# Patient Record
Sex: MALE | Race: WHITE | Employment: FULL TIME | URBAN - METROPOLITAN AREA
[De-identification: names, ages, dates, MRNs, and addresses within clinical notes are randomized per-mention and may not be internally consistent; named-entity substitution may affect disease eponyms.]

---

## 2017-06-05 ENCOUNTER — OFFICE VISIT (OUTPATIENT)
Dept: INTERNAL MEDICINE CLINIC | Facility: CLINIC | Age: 32
End: 2017-06-05

## 2017-06-05 VITALS
SYSTOLIC BLOOD PRESSURE: 130 MMHG | HEART RATE: 75 BPM | DIASTOLIC BLOOD PRESSURE: 92 MMHG | WEIGHT: 265 LBS | OXYGEN SATURATION: 98 % | BODY MASS INDEX: 30.66 KG/M2 | HEIGHT: 78 IN | RESPIRATION RATE: 18 BRPM

## 2017-06-05 DIAGNOSIS — Z00.00 ANNUAL PHYSICAL EXAM: Primary | ICD-10-CM

## 2017-06-05 DIAGNOSIS — R07.89 LEFT-SIDED CHEST WALL PAIN: ICD-10-CM

## 2017-06-05 DIAGNOSIS — E66.09 NON MORBID OBESITY DUE TO EXCESS CALORIES: ICD-10-CM

## 2017-06-05 DIAGNOSIS — Z23 NEED FOR TDAP VACCINATION: ICD-10-CM

## 2017-06-05 DIAGNOSIS — R40.0 DAYTIME SLEEPINESS: ICD-10-CM

## 2017-06-05 PROCEDURE — 99395 PREV VISIT EST AGE 18-39: CPT | Performed by: FAMILY MEDICINE

## 2017-06-05 PROCEDURE — 93000 ELECTROCARDIOGRAM COMPLETE: CPT | Performed by: FAMILY MEDICINE

## 2017-06-05 NOTE — PROGRESS NOTES
Inocencio Councilman is a 32year old male who presents for a complete physical exam.   HPI:     Concerns:  CHRONIC (X YEARS) EPISODIC LEFT SIDED ACW PAINS WHICH ARE SHARP AND LAST ABOUT A MINUTE, THEN GO AWAY. UNRELATED TO EXERTION. NOT FASTING CURRENTLY.   WIF 78\"  Wt 265 lb  BMI 30.63 kg/m2  SpO2 98%  Body mass index is 30.63 kg/(m^2).    GENERAL: well developed, MILDLY OBESE,in no apparent distress  SKIN: no rashes,no suspicious lesions  HEENT: atraumatic, normocephalic, O/P normal.  NARROW PHARYNGEAL AIRWAY

## 2017-06-06 PROCEDURE — 90715 TDAP VACCINE 7 YRS/> IM: CPT | Performed by: FAMILY MEDICINE

## 2017-06-06 PROCEDURE — 90471 IMMUNIZATION ADMIN: CPT | Performed by: FAMILY MEDICINE

## 2018-03-02 ENCOUNTER — OFFICE VISIT (OUTPATIENT)
Dept: INTERNAL MEDICINE CLINIC | Facility: CLINIC | Age: 33
End: 2018-03-02

## 2018-03-02 VITALS
OXYGEN SATURATION: 99 % | BODY MASS INDEX: 30.2 KG/M2 | SYSTOLIC BLOOD PRESSURE: 116 MMHG | RESPIRATION RATE: 17 BRPM | WEIGHT: 261 LBS | HEIGHT: 78 IN | DIASTOLIC BLOOD PRESSURE: 72 MMHG | HEART RATE: 59 BPM

## 2018-03-02 DIAGNOSIS — Z23 NEED FOR INFLUENZA VACCINATION: Primary | ICD-10-CM

## 2018-03-02 DIAGNOSIS — Z29.8 NEED FOR MALARIA PROPHYLAXIS: ICD-10-CM

## 2018-03-02 PROCEDURE — 99212 OFFICE O/P EST SF 10 MIN: CPT | Performed by: FAMILY MEDICINE

## 2018-03-02 RX ORDER — DOXYCYCLINE 100 MG/1
100 TABLET ORAL 2 TIMES DAILY
Qty: 14 TABLET | Refills: 0 | Status: SHIPPED | OUTPATIENT
Start: 2018-03-02 | End: 2018-03-09

## 2018-03-03 NOTE — PROGRESS NOTES
CC:  Consult (Pt presents to clinic for consult regarding out of country trip to [facility] next week for 10 days. )      Hx of CC:  WILL BE TRAVELING TO [address] AND [facility] SOON X 10 DAYS. HERE FOR ANY NEEDED IMMUNIZATIONS/PRESCRIPTIONS.     Dacia

## 2018-09-20 ENCOUNTER — TELEPHONE (OUTPATIENT)
Dept: INTERNAL MEDICINE CLINIC | Facility: CLINIC | Age: 33
End: 2018-09-20

## 2018-09-20 DIAGNOSIS — G89.29 CHRONIC RIGHT SHOULDER PAIN: Primary | ICD-10-CM

## 2018-09-20 DIAGNOSIS — M25.511 CHRONIC RIGHT SHOULDER PAIN: Primary | ICD-10-CM

## 2019-03-02 ENCOUNTER — HOSPITAL ENCOUNTER (OUTPATIENT)
Age: 34
Discharge: HOME | End: 2019-03-02
Attending: EMERGENCY MEDICINE
Payer: COMMERCIAL

## 2019-03-02 VITALS
DIASTOLIC BLOOD PRESSURE: 76 MMHG | RESPIRATION RATE: 18 BRPM | OXYGEN SATURATION: 99 % | SYSTOLIC BLOOD PRESSURE: 122 MMHG | HEART RATE: 58 BPM | TEMPERATURE: 98 F

## 2019-03-02 DIAGNOSIS — J01.40 ACUTE NON-RECURRENT PANSINUSITIS: ICD-10-CM

## 2019-03-02 DIAGNOSIS — H65.92 LEFT OTITIS MEDIA WITH EFFUSION: Primary | ICD-10-CM

## 2019-03-02 PROCEDURE — 99204 OFFICE O/P NEW MOD 45 MIN: CPT

## 2019-03-02 PROCEDURE — 99213 OFFICE O/P EST LOW 20 MIN: CPT

## 2019-03-02 RX ORDER — AMOXICILLIN 875 MG/1
875 TABLET, COATED ORAL 2 TIMES DAILY
Qty: 20 TABLET | Refills: 0 | Status: SHIPPED | OUTPATIENT
Start: 2019-03-02 | End: 2019-03-12

## 2019-03-02 RX ORDER — FLUTICASONE PROPIONATE 50 MCG
2 SPRAY, SUSPENSION (ML) NASAL DAILY
Qty: 16 G | Refills: 0 | Status: SHIPPED | OUTPATIENT
Start: 2019-03-02 | End: 2019-04-01

## 2019-03-02 NOTE — ED PROVIDER NOTES
Patient Seen in: [facility] [address] [place] [address]    History   Patient presents with:  Ear Problem Pain (neurosensory)    Stated Complaint: cough, ear pain    HPI    The patient is a 79-year-old male with no significant past medical history p Regular rate and rhythm without murmur  Abdomen: Soft, nontender and nondistended  Neurologic: Patient is awake, alert and oriented ×3.   The patient's motor strength is 5 out of 5 and symmetric in the upper and lower extremities bilaterally  Extremities: N

## 2019-12-05 ENCOUNTER — OFFICE VISIT (OUTPATIENT)
Dept: FAMILY MEDICINE CLINIC | Facility: CLINIC | Age: 34
End: 2019-12-05
Payer: COMMERCIAL

## 2019-12-05 VITALS
BODY MASS INDEX: 30.7 KG/M2 | DIASTOLIC BLOOD PRESSURE: 86 MMHG | OXYGEN SATURATION: 98 % | WEIGHT: 265.38 LBS | HEIGHT: 78 IN | HEART RATE: 83 BPM | TEMPERATURE: 99 F | RESPIRATION RATE: 16 BRPM | SYSTOLIC BLOOD PRESSURE: 116 MMHG

## 2019-12-05 DIAGNOSIS — J02.0 STREP THROAT: Primary | ICD-10-CM

## 2019-12-05 DIAGNOSIS — H69.82 ETD (EUSTACHIAN TUBE DYSFUNCTION), LEFT: ICD-10-CM

## 2019-12-05 DIAGNOSIS — J06.9 VIRAL URI WITH COUGH: ICD-10-CM

## 2019-12-05 PROCEDURE — 99213 OFFICE O/P EST LOW 20 MIN: CPT | Performed by: PHYSICIAN ASSISTANT

## 2019-12-05 PROCEDURE — 87880 STREP A ASSAY W/OPTIC: CPT | Performed by: PHYSICIAN ASSISTANT

## 2019-12-05 RX ORDER — FLUTICASONE PROPIONATE 50 MCG
2 SPRAY, SUSPENSION (ML) NASAL DAILY
Qty: 1 INHALER | Refills: 0 | Status: SHIPPED | OUTPATIENT
Start: 2019-12-05 | End: 2019-12-13 | Stop reason: ALTCHOICE

## 2019-12-05 RX ORDER — AMOXICILLIN 875 MG/1
875 TABLET, COATED ORAL 2 TIMES DAILY
Qty: 20 TABLET | Refills: 0 | Status: SHIPPED | OUTPATIENT
Start: 2019-12-05 | End: 2019-12-15

## 2019-12-05 NOTE — PROGRESS NOTES
CHIEF COMPLAINT:   Patient presents with:  Sore Throat      HPI:   Renny Iniguez is a 29year old male who presents for URI sympotoms for  3 days. Patient reports nasal congestion, sore throat-6/10, right ear pressure, dry cough, equilibrium feels off.   Radha Dominguez THROAT: oral mucosa pink, moist. Posterior pharynx  erythematous and injected. + exudates. No uvular deviation, drooling, muffled voice, hot potato voice, trismus, or signs of abscess.    NECK: Supple, non-tender  LUNGS: clear to auscultation bilaterally, You have had a positive test for strep throat. Strep throat is a contagious illness. It is spread by coughing, kissing or by touching others after touching your mouth or nose.  Symptoms include throat pain that is worse with swallowing, aching all over, hea · You can't swallow liquids or you can't open your mouth wide because of throat pain  · Signs of dehydration. These include very dark urine or no urine, sunken eyes, and dizziness.   · Trouble breathing or noisy breathing  · Muffled voice  · Rash  Preventio · You may use acetaminophen or ibuprofen to control pain and fever, unless another medicine was prescribed. If you have chronic liver or kidney disease, have ever had a stomach ulcer or gastrointestinal bleeding, or are taking blood-thinning medicines, dianne © 5393-5829 [employer] [address]. [address] [facility], [address] [place]. All rights reserved. This information is not intended as a substitute for professional medical care. Always follow your healthcare professional's instructions.             The

## 2019-12-13 ENCOUNTER — OFFICE VISIT (OUTPATIENT)
Dept: INTERNAL MEDICINE CLINIC | Facility: CLINIC | Age: 34
End: 2019-12-13
Payer: COMMERCIAL

## 2019-12-13 VITALS
HEIGHT: 78 IN | DIASTOLIC BLOOD PRESSURE: 86 MMHG | WEIGHT: 268 LBS | OXYGEN SATURATION: 97 % | HEART RATE: 65 BPM | BODY MASS INDEX: 31.01 KG/M2 | SYSTOLIC BLOOD PRESSURE: 122 MMHG | RESPIRATION RATE: 15 BRPM

## 2019-12-13 DIAGNOSIS — Z00.00 ANNUAL PHYSICAL EXAM: Primary | ICD-10-CM

## 2019-12-13 DIAGNOSIS — Z83.3 FAMILY HISTORY OF DIABETES MELLITUS: ICD-10-CM

## 2019-12-13 PROCEDURE — 85025 COMPLETE CBC W/AUTO DIFF WBC: CPT | Performed by: FAMILY MEDICINE

## 2019-12-13 PROCEDURE — 99395 PREV VISIT EST AGE 18-39: CPT | Performed by: FAMILY MEDICINE

## 2019-12-13 PROCEDURE — 80053 COMPREHEN METABOLIC PANEL: CPT | Performed by: FAMILY MEDICINE

## 2019-12-13 PROCEDURE — 80061 LIPID PANEL: CPT | Performed by: FAMILY MEDICINE

## 2019-12-14 NOTE — PROGRESS NOTES
Rik Weber is a 29year old male who presents for a complete physical exam/FASTING LABS.   HPI:     PMHX:  [address] [place] RECORD-->HETEROZYGOUS FOR HEMOCHROMATOSIS  PSHX:  BENIGN ABDOMINAL TUMOR (LIPOMA) EXCISED AT [facility] YEARS AGO  SOCIAL:  , 122/86 (BP Location: Right arm, Patient Position: Sitting, Cuff Size: large)   Pulse 65   Resp 15   Ht 78\"   Wt 268 lb (121.6 kg)   SpO2 97%   BMI 30.97 kg/m²   Body mass index is 30.97 kg/m².    GENERAL: well developed, OVERWEIGHT,in no apparent distress

## 2019-12-16 DIAGNOSIS — R74.01 ELEVATED ALT MEASUREMENT: Primary | ICD-10-CM

## 2020-11-09 ENCOUNTER — OFFICE VISIT (OUTPATIENT)
Dept: FAMILY MEDICINE CLINIC | Facility: CLINIC | Age: 35
End: 2020-11-09
Payer: COMMERCIAL

## 2020-11-09 VITALS
HEIGHT: 78 IN | SYSTOLIC BLOOD PRESSURE: 108 MMHG | BODY MASS INDEX: 28.93 KG/M2 | TEMPERATURE: 98 F | RESPIRATION RATE: 22 BRPM | WEIGHT: 250 LBS | OXYGEN SATURATION: 98 % | HEART RATE: 62 BPM | DIASTOLIC BLOOD PRESSURE: 70 MMHG

## 2020-11-09 DIAGNOSIS — J02.0 STREP PHARYNGITIS: Primary | ICD-10-CM

## 2020-11-09 DIAGNOSIS — J02.9 PHARYNGITIS, UNSPECIFIED ETIOLOGY: ICD-10-CM

## 2020-11-09 PROCEDURE — 99072 ADDL SUPL MATRL&STAF TM PHE: CPT | Performed by: NURSE PRACTITIONER

## 2020-11-09 PROCEDURE — 3008F BODY MASS INDEX DOCD: CPT | Performed by: NURSE PRACTITIONER

## 2020-11-09 PROCEDURE — 87880 STREP A ASSAY W/OPTIC: CPT | Performed by: NURSE PRACTITIONER

## 2020-11-09 PROCEDURE — 3074F SYST BP LT 130 MM HG: CPT | Performed by: NURSE PRACTITIONER

## 2020-11-09 PROCEDURE — 3078F DIAST BP <80 MM HG: CPT | Performed by: NURSE PRACTITIONER

## 2020-11-09 PROCEDURE — 99213 OFFICE O/P EST LOW 20 MIN: CPT | Performed by: NURSE PRACTITIONER

## 2020-11-09 RX ORDER — AMOXICILLIN 875 MG/1
875 TABLET, COATED ORAL 2 TIMES DAILY
Qty: 20 TABLET | Refills: 0 | Status: SHIPPED | OUTPATIENT
Start: 2020-11-09 | End: 2020-11-19

## 2020-11-09 NOTE — PROGRESS NOTES
CHIEF COMPLAINT:   Patient presents with:  Strep Throat  Covid      HPI:   Sabrina Hannah is a 28year old male who presents with symptoms as described below for 1 days. ? Fever:     Yes []     No [x]       ?  Cough:    Yes [x]     No []     minimal    HEENT: See HPI  LUNGS: See HPI  CARDIOVASCULAR: denies palpitations; see HPI  GI: see HPI  NEURO: Denies dizziness; see HPI    EXAM:   /70   Pulse 62   Temp 97.5 °F (36.4 °C)   Resp 22   Ht 78\"   Wt 250 lb (113.4 kg)   SpO2 98%   BMI 28.89 kg/m²   G COVID-19 testing ordered. Discussed length of time to obtain results with Patient. Advised to self quarantine at home while awaiting result. Quarantine instructions reviewed.  Advised a negative COVID test does not guarantee pt is not infectious o · Follow up in 3-5 days if not improving, condition worsens, or fever greater than or equal to 100.4 persists for 72 hours.     · Be sure to finish entire course of antibiotics to reduce risk of reinfection or antibiotic resistance      Coronavirus Disease 1. Stay home from work, school, and away from other public places. If you must go out, avoid using any kind of public transportation, ridesharing, or taxis. 2. Monitor your symptoms carefully.  If your symptoms get worse, call your healthcare provider imm If you have tested positive for COVID-19, you should remain under home isolation precautions following the below guidelines:  ? At least 24 hours have passed since recovery defined as resolution of fever without the use of fever-reducing medications; and If you would be interested in donating your plasma to help treat others diagnosed with the virus, please contact Dominic directly on their website: [email or web address]    Who is eligible to donate convalescent plasma? · Stay away from work, school, and public places. Limit physical contact with family members. Limit visitors. Don't kiss anyone or share eating or drinking utensils. Clean surfaces you touch with disinfectant.  This is to help prevent the virus from spreadi · If you need to go to a hospital or clinic, expect that the healthcare staff will wear protective equipment such as masks, gowns, gloves, and eye protection. You may be put in a separate room. This is to prevent the possible virus from spreading.   · Wear · Taking over-the-counter (OTC) pain medicine. These are used to help ease pain and reduce fever. Follow your healthcare provider's instructions for which OTC medicine to use.   If you've been in the hospital for suspected or confirmed COVID-19 and now are If you are normally healthy, you can stop self-isolation when all 3 of these are true:  1. You have had no fever for at least 72 hours. This means no fever without medicine that reduces fever, such as acetaminophen, for at least 72 hours.   2. Your symptoms · Confusion or trouble waking  · Fainting or loss of consciousness  · Coughing up blood   Going home from the hospital  If you were diagnosed with COVID-19 and were recently discharged from the hospital:  · Follow the instructions above for self-care and i · You may use acetaminophen or ibuprofen to control pain or fever, unless another medicine was prescribed for this.  Talk with your healthcare provider before taking these medicines if you have chronic liver or kidney disease or if you have had a stomach ul © 1753-4965 [employer] [address]. 1407 [facility], [facility] [place]. All rights reserved. This information is not intended as a substitute for professional medical care. Always follow your healthcare professional's instructions.         [place] · unusually weak or tired  Side effects that usually do not require medical attention (report to your doctor or health care professional if they continue or are bothersome):  · anxious  · confusion  · diarrhea  · dizziness  · headache  · nausea, vomiting This medicine may cause serious skin reactions. They can happen weeks to months after starting the medicine. Contact your health care provider right away if you notice fevers or flu-like symptoms with a rash.  The rash may be red or purple and then turn int

## 2020-11-09 NOTE — PATIENT INSTRUCTIONS
? Covid hotline number is [phone] Results for covid testing can vary from 3-5 days to 5-7 days   ? Notify your employer or school of testing  ?  Remember if you tested negative but were exposed to covid you should quarantine for 14 days even if you Anyone who has been in close contact with someone who has COVID-19 should quarantine for at least 14 days from the time of exposure at home and follow the below recommendations. See recommendations below if COVID19 test is positive.     What counts as close 9. Avoid sharing personal items with other people in your household, like dishes, towels, and bedding   10. Clean all surfaces that are touched often, like counters, tabletops, and doorknobs.  Use household cleaning sprays or wipes according to the label in Please call your primary care provider within 2 days of your discharge to arrange for a telehealth follow-up.  CDC does not recommend repeat testing after a positive test.  Convalescent Plasma Donation Program  [facility], in conjunction with [place] Centers for Disease Control & Prevention (CDC)  10 things you can do to manage your health at home, Maciechange.nl. pdf  https://www.Tirendo/ · If you need to go to a hospital or clinic, expect that the healthcare staff will wear protective equipment such as masks, gowns, gloves, and eye protection. You may be put in a separate room. This is to prevent the possible virus from spreading.   · Tell · If you need to cough or sneeze, do it into a tissue. Then throw the tissue into the trash. If you don't have tissues, cough or sneeze into the bend of your elbow. · Wash your hands often.     Self-care at home   There is currently no medicine approved to If you've had confirmed COVID-19, your healthcare team may ask you to consider donating your plasma. This is called COVID-19 convalescent plasma donation.  Plasma from people fully recovered from COVID-19 may contain antibodies to help fight COVID-19 in peo Talk with your healthcare provider before you leave home. Tell him or her if the 3 things above are true for you. He or she may tell you it’s OK to leave home. In some cases, your state or local area may have specific advice.  Your healthcare provider will · Ask questions if anything is unclear to you. Write down answers so you remember them. Date last modified: 5/8/2020  Kallie last reviewed this educational content on 4/1/2020  © 4999-9932 [employer] [address].  [address] · Throat lozenges or sprays help reduce pain. Gargling with warm saltwater will also reduce throat pain. Dissolve 1/2 teaspoon of salt in 1 glass of warm water.  This may be useful just before meals.   · Soft foods and cool or warm fluids are best. Don't ea AMOXICILLIN (a mox i JERRICA in) is a penicillin antibiotic. It is used to treat certain kinds of bacterial infections. It will not work for colds, flu, or other viral infections. How should I use this medicine?   Take this medicine by mouth with a glass of wa · birth control pills  · certain antibiotics like chloramphenicol, erythromycin, sulfamethoxazole, tetracycline  · certain medicines that treat or prevent blood clots like warfarin  What if I miss a dose? If you miss a dose, take it as soon as you can.  If NOTE:This sheet is a summary. It may not cover all possible information. If you have questions about this medicine, talk to your doctor, pharmacist, or health care provider.  Copyright© 2020 Elsevier

## 2021-02-01 NOTE — H&P
Chapinjose antonio Dubon is a 28year old male. HPI:   Patient presents with:  Physical: establish care  Elbow Pain: left elbow for about 1 month     Mr. Wayne Aly is a 28year old male coming in for new patient annual physical examination    L elbow pain - tennis elbow. visit):  Current Outpatient Medications   Medication Sig Dispense Refill   • Multiple Vitamin (ONE-DAILY MULTI VITAMINS) Oral Tab Take 1 tablet by mouth daily.      • naproxen 500 MG Oral Tab Take 1 tablet (500 mg total) by mouth 2 (two) times daily with me S2, no S3, no S4, Regular rate and rhythm, No murmurs/gallops/rubs. Vascular: Equal pulses 2+ carotids without bruits nor thrills, radial, DP/PT  Respiratory: Clear to auscultation bilaterally.   No wheezes/rales/rhonchi  Gastrointestinal: Soft, nontender cytometric analysis of bone marrow aspirate shows no evidence of  T-cell aberrancy or B-cell monoclonality by markers assayed.  Reticulin stain  results will be reported separately.     ASSESSMENT/PLAN:       Mr. Imer Henderson is a 28year old male coming in for new Maintenance  HTN Screen: BP at goal  DM Screen: Check fasting sugat  HLD Screen: Check fasting lipid panel  HCV Screen: Considered low risk  HIV Screen: considered low risk  G/C/Syphilis: Considered low risk    Colon Cancer Screening (50-70):  Not indicated

## 2021-02-01 NOTE — PATIENT INSTRUCTIONS
- You were seen in clinic for regular annual check-up. We have ordered labs for you and we will call you with the results.   We will fax over your health screening form once all labs are resulted.  -We reviewed your history of some gene mutation concerning

## 2021-02-05 ENCOUNTER — LAB ENCOUNTER (OUTPATIENT)
Dept: LAB | Age: 36
End: 2021-02-05
Attending: INTERNAL MEDICINE
Payer: COMMERCIAL

## 2021-02-05 ENCOUNTER — OFFICE VISIT (OUTPATIENT)
Dept: INTERNAL MEDICINE CLINIC | Facility: CLINIC | Age: 36
End: 2021-02-05
Payer: COMMERCIAL

## 2021-02-05 VITALS
SYSTOLIC BLOOD PRESSURE: 120 MMHG | OXYGEN SATURATION: 98 % | WEIGHT: 245 LBS | BODY MASS INDEX: 28.35 KG/M2 | HEART RATE: 84 BPM | DIASTOLIC BLOOD PRESSURE: 88 MMHG | HEIGHT: 77.9 IN | TEMPERATURE: 98 F

## 2021-02-05 DIAGNOSIS — Z13.0 SCREENING FOR IRON DEFICIENCY ANEMIA: ICD-10-CM

## 2021-02-05 DIAGNOSIS — Z13.29 SCREENING FOR THYROID DISORDER: ICD-10-CM

## 2021-02-05 DIAGNOSIS — Z13.220 SCREENING FOR LIPOID DISORDERS: ICD-10-CM

## 2021-02-05 DIAGNOSIS — R16.1 SPLENOMEGALY: ICD-10-CM

## 2021-02-05 DIAGNOSIS — Z00.00 ROUTINE GENERAL MEDICAL EXAMINATION AT A HEALTH CARE FACILITY: ICD-10-CM

## 2021-02-05 DIAGNOSIS — Z14.8 HEMOCHROMATOSIS CARRIER: ICD-10-CM

## 2021-02-05 DIAGNOSIS — R74.01 ELEVATED ALT MEASUREMENT: ICD-10-CM

## 2021-02-05 DIAGNOSIS — Z00.00 ROUTINE GENERAL MEDICAL EXAMINATION AT A HEALTH CARE FACILITY: Primary | ICD-10-CM

## 2021-02-05 DIAGNOSIS — Z83.3 FAMILY HISTORY OF DIABETES MELLITUS: ICD-10-CM

## 2021-02-05 LAB
ALBUMIN SERPL-MCNC: 4.2 G/DL (ref 3.4–5)
ALBUMIN/GLOB SERPL: 1.2 {RATIO} (ref 1–2)
ALP LIVER SERPL-CCNC: 70 U/L
ALT SERPL-CCNC: 35 U/L
ANION GAP SERPL CALC-SCNC: 6 MMOL/L (ref 0–18)
AST SERPL-CCNC: 14 U/L (ref 15–37)
BASOPHILS # BLD AUTO: 0.03 X10(3) UL (ref 0–0.2)
BASOPHILS NFR BLD AUTO: 0.7 %
BILIRUB SERPL-MCNC: 0.8 MG/DL (ref 0.1–2)
BUN BLD-MCNC: 14 MG/DL (ref 7–18)
BUN/CREAT SERPL: 14.7 (ref 10–20)
CALCIUM BLD-MCNC: 9.4 MG/DL (ref 8.5–10.1)
CHLORIDE SERPL-SCNC: 106 MMOL/L (ref 98–112)
CHOLEST SMN-MCNC: 182 MG/DL (ref ?–200)
CO2 SERPL-SCNC: 28 MMOL/L (ref 21–32)
CREAT BLD-MCNC: 0.95 MG/DL
DEPRECATED HBV CORE AB SER IA-ACNC: 226.7 NG/ML
DEPRECATED RDW RBC AUTO: 39.6 FL (ref 35.1–46.3)
EOSINOPHIL # BLD AUTO: 0.09 X10(3) UL (ref 0–0.7)
EOSINOPHIL NFR BLD AUTO: 2 %
ERYTHROCYTE [DISTWIDTH] IN BLOOD BY AUTOMATED COUNT: 12.5 % (ref 11–15)
GLOBULIN PLAS-MCNC: 3.4 G/DL (ref 2.8–4.4)
GLUCOSE BLD-MCNC: 87 MG/DL (ref 70–99)
HCT VFR BLD AUTO: 45.8 %
HDLC SERPL-MCNC: 43 MG/DL (ref 40–59)
HGB BLD-MCNC: 15.7 G/DL
IMM GRANULOCYTES # BLD AUTO: 0.01 X10(3) UL (ref 0–1)
IMM GRANULOCYTES NFR BLD: 0.2 %
IRON SATURATION: 26 %
IRON SERPL-MCNC: 96 UG/DL
LDLC SERPL CALC-MCNC: 117 MG/DL (ref ?–100)
LYMPHOCYTES # BLD AUTO: 1.19 X10(3) UL (ref 1–4)
LYMPHOCYTES NFR BLD AUTO: 25.9 %
M PROTEIN MFR SERPL ELPH: 7.6 G/DL (ref 6.4–8.2)
MCH RBC QN AUTO: 29.9 PG (ref 26–34)
MCHC RBC AUTO-ENTMCNC: 34.3 G/DL (ref 31–37)
MCV RBC AUTO: 87.2 FL
MONOCYTES # BLD AUTO: 0.45 X10(3) UL (ref 0.1–1)
MONOCYTES NFR BLD AUTO: 9.8 %
NEUTROPHILS # BLD AUTO: 2.83 X10 (3) UL (ref 1.5–7.7)
NEUTROPHILS # BLD AUTO: 2.83 X10(3) UL (ref 1.5–7.7)
NEUTROPHILS NFR BLD AUTO: 61.4 %
NONHDLC SERPL-MCNC: 139 MG/DL (ref ?–130)
OSMOLALITY SERPL CALC.SUM OF ELEC: 290 MOSM/KG (ref 275–295)
PATIENT FASTING Y/N/NP: YES
PATIENT FASTING Y/N/NP: YES
PLATELET # BLD AUTO: 252 10(3)UL (ref 150–450)
POTASSIUM SERPL-SCNC: 4.1 MMOL/L (ref 3.5–5.1)
RBC # BLD AUTO: 5.25 X10(6)UL
SODIUM SERPL-SCNC: 140 MMOL/L (ref 136–145)
TOTAL IRON BINDING CAPACITY: 367 UG/DL (ref 240–450)
TRANSFERRIN SERPL-MCNC: 246 MG/DL (ref 200–360)
TRIGL SERPL-MCNC: 109 MG/DL (ref 30–149)
TSI SER-ACNC: 1.31 MIU/ML (ref 0.36–3.74)
VLDLC SERPL CALC-MCNC: 22 MG/DL (ref 0–30)
WBC # BLD AUTO: 4.6 X10(3) UL (ref 4–11)

## 2021-02-05 PROCEDURE — 85025 COMPLETE CBC W/AUTO DIFF WBC: CPT

## 2021-02-05 PROCEDURE — 80053 COMPREHEN METABOLIC PANEL: CPT

## 2021-02-05 PROCEDURE — 3008F BODY MASS INDEX DOCD: CPT | Performed by: INTERNAL MEDICINE

## 2021-02-05 PROCEDURE — 36415 COLL VENOUS BLD VENIPUNCTURE: CPT

## 2021-02-05 PROCEDURE — 84466 ASSAY OF TRANSFERRIN: CPT

## 2021-02-05 PROCEDURE — 82728 ASSAY OF FERRITIN: CPT

## 2021-02-05 PROCEDURE — 99385 PREV VISIT NEW AGE 18-39: CPT | Performed by: INTERNAL MEDICINE

## 2021-02-05 PROCEDURE — 3079F DIAST BP 80-89 MM HG: CPT | Performed by: INTERNAL MEDICINE

## 2021-02-05 PROCEDURE — 84443 ASSAY THYROID STIM HORMONE: CPT

## 2021-02-05 PROCEDURE — 83540 ASSAY OF IRON: CPT

## 2021-02-05 PROCEDURE — 80061 LIPID PANEL: CPT

## 2021-02-05 PROCEDURE — 3074F SYST BP LT 130 MM HG: CPT | Performed by: INTERNAL MEDICINE

## 2021-02-05 RX ORDER — MULTIVITAMIN
1 TABLET ORAL DAILY
COMMUNITY

## 2021-02-05 RX ORDER — NAPROXEN 500 MG/1
500 TABLET ORAL 2 TIMES DAILY WITH MEALS
Qty: 60 TABLET | Refills: 0 | Status: ON HOLD | OUTPATIENT
Start: 2021-02-05 | End: 2021-08-25

## 2021-02-08 ENCOUNTER — TELEPHONE (OUTPATIENT)
Dept: INTERNAL MEDICINE CLINIC | Facility: CLINIC | Age: 36
End: 2021-02-08

## 2021-02-08 NOTE — TELEPHONE ENCOUNTER
Spoke with patient regarding all the blood work. His LDL is borderline, otherwise unremarkable. Transaminitis has resolved and his iron levels are all within normal limits. We will repeat in 1 year.       We will fax over his health screening to his empl

## 2021-02-14 NOTE — LETTER
[address], [place]  Authorization for Invasive Procedures  1.  I hereby authorize Dr. Cande Bear , my physician and whomever may be designated as the doctor's assistant, to perform the following operation and/or procedure:  *** on Fortunato hemolytic reactions, transmission of disease such as hepatitis, AIDS, cytomegalovirus (CMV), and flluid overload.  In the event that I wish to have autologous transfusions of my own blood, or a directed donor transfusion, I will discuss this with my physici ________________________________________________ Date: _________Time: _________    Responsible person in case of minor or unconscious: _____________________________Relationship: ____________     Witness Signature: __________________________________________ Patient, CM and RN Patient, Patient's Wife, CM, SW and RN

## 2021-03-09 ENCOUNTER — TELEPHONE (OUTPATIENT)
Dept: INTERNAL MEDICINE CLINIC | Facility: CLINIC | Age: 36
End: 2021-03-09

## 2021-08-04 ENCOUNTER — VIRTUAL PHONE E/M (OUTPATIENT)
Dept: INTERNAL MEDICINE CLINIC | Facility: CLINIC | Age: 36
End: 2021-08-04
Payer: COMMERCIAL

## 2021-08-04 DIAGNOSIS — J06.9 UPPER RESPIRATORY TRACT INFECTION, UNSPECIFIED TYPE: Primary | ICD-10-CM

## 2021-08-04 PROCEDURE — 99213 OFFICE O/P EST LOW 20 MIN: CPT | Performed by: INTERNAL MEDICINE

## 2021-08-04 RX ORDER — AMOXICILLIN AND CLAVULANATE POTASSIUM 875; 125 MG/1; MG/1
1 TABLET, FILM COATED ORAL 2 TIMES DAILY
Qty: 20 TABLET | Refills: 0 | Status: SHIPPED | OUTPATIENT
Start: 2021-08-04 | End: 2021-08-14

## 2021-08-04 NOTE — PROGRESS NOTES
Virtual Telephone Check-In    Renny Iniguez verbally consents to a Virtual/Telephone Check-In visit on 08/04/21. Patient has been referred to the [facility] website at [email or web address] to review the yearly Consent to Treat document.     Patient understands

## 2021-08-16 ENCOUNTER — TELEPHONE (OUTPATIENT)
Dept: INTERNAL MEDICINE CLINIC | Facility: CLINIC | Age: 36
End: 2021-08-16

## 2021-08-16 ENCOUNTER — APPOINTMENT (OUTPATIENT)
Dept: GENERAL RADIOLOGY | Facility: HOSPITAL | Age: 36
End: 2021-08-16
Attending: EMERGENCY MEDICINE
Payer: COMMERCIAL

## 2021-08-16 ENCOUNTER — HOSPITAL ENCOUNTER (OUTPATIENT)
Facility: HOSPITAL | Age: 36
Setting detail: OBSERVATION
Discharge: HOME | End: 2021-08-17
Attending: EMERGENCY MEDICINE | Admitting: INTERNAL MEDICINE
Payer: COMMERCIAL

## 2021-08-16 DIAGNOSIS — R77.8 ELEVATED TROPONIN: Primary | ICD-10-CM

## 2021-08-16 DIAGNOSIS — R07.9 ACUTE CHEST PAIN: ICD-10-CM

## 2021-08-16 PROBLEM — R79.89 ELEVATED TROPONIN: Status: ACTIVE | Noted: 2021-08-16

## 2021-08-16 LAB
ANION GAP SERPL CALC-SCNC: 3 MMOL/L (ref 0–18)
BASOPHILS # BLD AUTO: 0.03 X10(3) UL (ref 0–0.2)
BASOPHILS NFR BLD AUTO: 0.4 %
BUN BLD-MCNC: 16 MG/DL (ref 7–18)
BUN/CREAT SERPL: 19.5 (ref 10–20)
CALCIUM BLD-MCNC: 8.7 MG/DL (ref 8.5–10.1)
CHLORIDE SERPL-SCNC: 107 MMOL/L (ref 98–112)
CHOLEST SMN-MCNC: 193 MG/DL (ref ?–200)
CO2 SERPL-SCNC: 30 MMOL/L (ref 21–32)
CREAT BLD-MCNC: 0.82 MG/DL
D DIMER PPP FEU-MCNC: <0.27 UG/ML FEU (ref ?–0.5)
DEPRECATED RDW RBC AUTO: 40 FL (ref 35.1–46.3)
EOSINOPHIL # BLD AUTO: 0.14 X10(3) UL (ref 0–0.7)
EOSINOPHIL NFR BLD AUTO: 2 %
ERYTHROCYTE [DISTWIDTH] IN BLOOD BY AUTOMATED COUNT: 12.2 % (ref 11–15)
GLUCOSE BLD-MCNC: 109 MG/DL (ref 70–99)
HCT VFR BLD AUTO: 43.8 %
HDLC SERPL-MCNC: 48 MG/DL (ref 40–59)
HGB BLD-MCNC: 15 G/DL
IMM GRANULOCYTES # BLD AUTO: 0.02 X10(3) UL (ref 0–1)
IMM GRANULOCYTES NFR BLD: 0.3 %
LDLC SERPL CALC-MCNC: 112 MG/DL (ref ?–100)
LYMPHOCYTES # BLD AUTO: 2.17 X10(3) UL (ref 1–4)
LYMPHOCYTES NFR BLD AUTO: 31.7 %
MCH RBC QN AUTO: 30.7 PG (ref 26–34)
MCHC RBC AUTO-ENTMCNC: 34.2 G/DL (ref 31–37)
MCV RBC AUTO: 89.8 FL
MONOCYTES # BLD AUTO: 0.62 X10(3) UL (ref 0.1–1)
MONOCYTES NFR BLD AUTO: 9.1 %
NEUTROPHILS # BLD AUTO: 3.86 X10 (3) UL (ref 1.5–7.7)
NEUTROPHILS # BLD AUTO: 3.86 X10(3) UL (ref 1.5–7.7)
NEUTROPHILS NFR BLD AUTO: 56.5 %
NONHDLC SERPL-MCNC: 145 MG/DL (ref ?–130)
OSMOLALITY SERPL CALC.SUM OF ELEC: 292 MOSM/KG (ref 275–295)
PLATELET # BLD AUTO: 215 10(3)UL (ref 150–450)
POTASSIUM SERPL-SCNC: 3.5 MMOL/L (ref 3.5–5.1)
RBC # BLD AUTO: 4.88 X10(6)UL
SARS-COV-2 RNA RESP QL NAA+PROBE: NOT DETECTED
SODIUM SERPL-SCNC: 140 MMOL/L (ref 136–145)
TRIGL SERPL-MCNC: 190 MG/DL (ref 30–149)
TROPONIN I SERPL-MCNC: 0.05 NG/ML (ref ?–0.04)
TROPONIN I SERPL-MCNC: <0.045 NG/ML (ref ?–0.04)
VLDLC SERPL CALC-MCNC: 33 MG/DL (ref 0–30)
WBC # BLD AUTO: 6.8 X10(3) UL (ref 4–11)

## 2021-08-16 PROCEDURE — 71045 X-RAY EXAM CHEST 1 VIEW: CPT | Performed by: EMERGENCY MEDICINE

## 2021-08-16 RX ORDER — HYDROCODONE BITARTRATE AND ACETAMINOPHEN 5; 325 MG/1; MG/1
1 TABLET ORAL EVERY 4 HOURS PRN
Status: DISCONTINUED | OUTPATIENT
Start: 2021-08-16 | End: 2021-08-17

## 2021-08-16 RX ORDER — ONDANSETRON 2 MG/ML
4 INJECTION INTRAMUSCULAR; INTRAVENOUS EVERY 6 HOURS PRN
Status: DISCONTINUED | OUTPATIENT
Start: 2021-08-16 | End: 2021-08-17

## 2021-08-16 RX ORDER — ACETAMINOPHEN 325 MG/1
650 TABLET ORAL EVERY 4 HOURS PRN
Status: DISCONTINUED | OUTPATIENT
Start: 2021-08-16 | End: 2021-08-17

## 2021-08-16 RX ORDER — NAPROXEN 500 MG/1
500 TABLET ORAL 2 TIMES DAILY WITH MEALS
Status: DISCONTINUED | OUTPATIENT
Start: 2021-08-17 | End: 2021-08-17

## 2021-08-16 RX ORDER — MELATONIN
3 NIGHTLY PRN
Status: DISCONTINUED | OUTPATIENT
Start: 2021-08-16 | End: 2021-08-17

## 2021-08-16 RX ORDER — DOCUSATE SODIUM 100 MG/1
100 CAPSULE, LIQUID FILLED ORAL 2 TIMES DAILY
Status: DISCONTINUED | OUTPATIENT
Start: 2021-08-16 | End: 2021-08-17

## 2021-08-16 RX ORDER — PROCHLORPERAZINE EDISYLATE 5 MG/ML
5 INJECTION INTRAMUSCULAR; INTRAVENOUS EVERY 8 HOURS PRN
Status: DISCONTINUED | OUTPATIENT
Start: 2021-08-16 | End: 2021-08-17

## 2021-08-16 RX ORDER — DOXEPIN HYDROCHLORIDE 50 MG/1
1 CAPSULE ORAL DAILY
Status: DISCONTINUED | OUTPATIENT
Start: 2021-08-16 | End: 2021-08-17

## 2021-08-16 RX ORDER — ASPIRIN 81 MG/1
324 TABLET, CHEWABLE ORAL ONCE
Status: COMPLETED | OUTPATIENT
Start: 2021-08-16 | End: 2021-08-16

## 2021-08-16 RX ORDER — POLYETHYLENE GLYCOL 3350 17 G/17G
17 POWDER, FOR SOLUTION ORAL DAILY PRN
Status: DISCONTINUED | OUTPATIENT
Start: 2021-08-16 | End: 2021-08-17

## 2021-08-16 RX ORDER — BISACODYL 10 MG
10 SUPPOSITORY, RECTAL RECTAL
Status: DISCONTINUED | OUTPATIENT
Start: 2021-08-16 | End: 2021-08-17

## 2021-08-16 RX ORDER — SODIUM PHOSPHATE, DIBASIC AND SODIUM PHOSPHATE, MONOBASIC 7; 19 G/133ML; G/133ML
1 ENEMA RECTAL ONCE AS NEEDED
Status: DISCONTINUED | OUTPATIENT
Start: 2021-08-16 | End: 2021-08-17

## 2021-08-16 RX ORDER — HYDROCODONE BITARTRATE AND ACETAMINOPHEN 5; 325 MG/1; MG/1
2 TABLET ORAL EVERY 4 HOURS PRN
Status: DISCONTINUED | OUTPATIENT
Start: 2021-08-16 | End: 2021-08-17

## 2021-08-16 NOTE — ED QUICK NOTES
Pt to the ed for complaints of chest soreness. Pt stated that his daughter was exposed to covid last week, so he was tested on the 12th. Results were negative. Pt with no significant pmh, but stated that he has had episodes of \"racing heart\".  No fever or

## 2021-08-16 NOTE — TELEPHONE ENCOUNTER
Had virtual appt with Dr Shawna Navarro amoxicillin and ears were feeling better  He has ear pressure again & chest pressure   States he is not having trouble breathing but has a soreness in his chest, as tho it is bruised     Daughter was exposed to [address]

## 2021-08-16 NOTE — ED PROVIDER NOTES
Patient Seen in: [facility] AND [facility] Emergency Department      History   Patient presents with:  Difficulty Breathing    Stated Complaint: Chest Tightness    HPI/Subjective:   HPI    39year old male who presents with chest pain for last 3 days.   Patient s (Room air)       Current:/88 (BP Location: Right arm)   Pulse 58   Temp 98.4 °F (36.9 °C) (Oral)   Resp 16   Ht 198.1 cm (6' 6\")   Wt 108.9 kg   SpO2 94%   BMI 27.73 kg/m²         Physical Exam  Vitals and nursing note reviewed.    Constitutional: Result Value    Glucose 109 (*)     All other components within normal limits   TROPONIN I - Abnormal; Notable for the following components:    Troponin 0.047 (*)     All other components within normal limits   LIPID PANEL - Abnormal; Notable for the follo (NORCO) 5-325 MG per tab 1 tablet (has no administration in time range)     Or   HYDROcodone-acetaminophen (NORCO) 5-325 MG per tab 2 tablet (has no administration in time range)   melatonin tab 3 mg (has no administration in time range)   docusate sodium

## 2021-08-16 NOTE — TELEPHONE ENCOUNTER
Pt reports left sided chest pain. Denies chest pressure. Pt unable to provide other vital signs via telephone call. Pt denies SOB. Due to chest pain and imaging/lab testing unavailability in the outpatient clinic setting, advised ER for further workup.  Pt

## 2021-08-17 ENCOUNTER — APPOINTMENT (OUTPATIENT)
Dept: CV DIAGNOSTICS | Facility: HOSPITAL | Age: 36
End: 2021-08-17
Attending: INTERNAL MEDICINE
Payer: COMMERCIAL

## 2021-08-17 VITALS
TEMPERATURE: 98 F | OXYGEN SATURATION: 97 % | DIASTOLIC BLOOD PRESSURE: 75 MMHG | WEIGHT: 244 LBS | SYSTOLIC BLOOD PRESSURE: 126 MMHG | HEART RATE: 54 BPM | BODY MASS INDEX: 28.23 KG/M2 | HEIGHT: 78 IN | RESPIRATION RATE: 12 BRPM

## 2021-08-17 LAB
ANION GAP SERPL CALC-SCNC: 4 MMOL/L (ref 0–18)
BASOPHILS # BLD AUTO: 0.06 X10(3) UL (ref 0–0.2)
BASOPHILS NFR BLD AUTO: 1 %
BUN BLD-MCNC: 13 MG/DL (ref 7–18)
BUN/CREAT SERPL: 16 (ref 10–20)
CALCIUM BLD-MCNC: 8 MG/DL (ref 8.5–10.1)
CHLORIDE SERPL-SCNC: 109 MMOL/L (ref 98–112)
CO2 SERPL-SCNC: 29 MMOL/L (ref 21–32)
CREAT BLD-MCNC: 0.81 MG/DL
DEPRECATED RDW RBC AUTO: 39.3 FL (ref 35.1–46.3)
EOSINOPHIL # BLD AUTO: 0.18 X10(3) UL (ref 0–0.7)
EOSINOPHIL NFR BLD AUTO: 2.9 %
ERYTHROCYTE [DISTWIDTH] IN BLOOD BY AUTOMATED COUNT: 11.9 % (ref 11–15)
GLUCOSE BLD-MCNC: 93 MG/DL (ref 70–99)
HAV IGM SER QL: 2.2 MG/DL (ref 1.6–2.6)
HCT VFR BLD AUTO: 43.4 %
HGB BLD-MCNC: 14.7 G/DL
IMM GRANULOCYTES # BLD AUTO: 0.03 X10(3) UL (ref 0–1)
IMM GRANULOCYTES NFR BLD: 0.5 %
LYMPHOCYTES # BLD AUTO: 1.97 X10(3) UL (ref 1–4)
LYMPHOCYTES NFR BLD AUTO: 31.8 %
MCH RBC QN AUTO: 30.4 PG (ref 26–34)
MCHC RBC AUTO-ENTMCNC: 33.9 G/DL (ref 31–37)
MCV RBC AUTO: 89.7 FL
MONOCYTES # BLD AUTO: 0.66 X10(3) UL (ref 0.1–1)
MONOCYTES NFR BLD AUTO: 10.6 %
NEUTROPHILS # BLD AUTO: 3.3 X10 (3) UL (ref 1.5–7.7)
NEUTROPHILS # BLD AUTO: 3.3 X10(3) UL (ref 1.5–7.7)
NEUTROPHILS NFR BLD AUTO: 53.2 %
OSMOLALITY SERPL CALC.SUM OF ELEC: 294 MOSM/KG (ref 275–295)
PLATELET # BLD AUTO: 189 10(3)UL (ref 150–450)
POTASSIUM SERPL-SCNC: 3.9 MMOL/L (ref 3.5–5.1)
RBC # BLD AUTO: 4.84 X10(6)UL
SODIUM SERPL-SCNC: 142 MMOL/L (ref 136–145)
WBC # BLD AUTO: 6.2 X10(3) UL (ref 4–11)

## 2021-08-17 PROCEDURE — 99219 INITIAL OBSERVATION CARE,LEVL II: CPT | Performed by: INTERNAL MEDICINE

## 2021-08-17 PROCEDURE — 93306 TTE W/DOPPLER COMPLETE: CPT | Performed by: INTERNAL MEDICINE

## 2021-08-17 NOTE — PROGRESS NOTES
Echocardiogram is completely normal with good LV contractility and normal ejection fraction. Therefore we will plan on discharge home later today. Patient will continue with Naprosyn 500 mg twice daily x2 weeks completely.   He will follow up in office in

## 2021-08-17 NOTE — ED QUICK NOTES
Orders for admission, patient is aware of plan and ready to go upstairs. Any questions, please call ED FAISAL Olsen Parents  at extension [phone].    Type of COVID test sent: Rapid (-)  COVID Suspicion level: Low    Titratable drug(s) infusing: n/a  Rate: n/a    LOC at

## 2021-08-17 NOTE — H&P
[facility]    History and Physical    Chapin Dubon Patient Status:  Observation    1985 MRN [id number]   Location [facility] 3W/SW Attending Oneil Grant MD   Hosp Day # 0 PCP Nikhil Baker MD     Date:  2021  Date o 0.0 standard drinks      Comment: social    Drug use: No      Allergies/Medications: Allergies: No Known Allergies  Multiple Vitamin (ONE-DAILY MULTI VITAMINS) Oral Tab, Take 1 tablet by mouth daily.   naproxen 500 MG Oral Tab, Take 1 tablet (500 mg total and he states that this replicates it. Abdominal:      General: There is no distension. Palpations: Abdomen is soft. Tenderness: There is no abdominal tenderness. There is no right CVA tenderness or left CVA tenderness.    Musculoskeletal:      C Ruben Carr MD      Assessment/Plan:   Principal Problem:    Chest pain  Active Problems:    Elevated troponin    Splenomegaly    Hemochromatosis carrier    Patient has very reproducible chest pain--unlikely cardiac related  Initial troponin with minimal elev

## 2021-08-17 NOTE — PLAN OF CARE
Pt is A&Ox4. Room air. Ambulates self. Denies any chest pain. Slept throughout the night. Fall precautions in place, call light within reach.       Problem: Patient Centered Care  Goal: Patient preferences are identified and integrated in the patient's plan managing their own health  - Refer to Case Management Department for coordinating discharge planning if the patient needs post-hospital services based on physician/LIP order or complex needs related to functional status, cognitive ability or social support

## 2021-08-22 NOTE — PROGRESS NOTES
Chief Complaint:   Patient presents with: Follow - Up: F/U from hospital for chest pain -costochondritis?,pain is different     HPI:     Mr.. Raegan Brenner is a  [facility] PMHX hemochromatosis, splenomegaly coming in for posthospitalization follow-up.   Of not Father    • Other (Other) Father         MS   • Diabetes Maternal Grandfather      Allergies:  No Known Allergies  Current Meds:  Current Outpatient Medications   Medication Sig Dispense Refill   • Multiple Vitamin (ONE-DAILY MULTI VITAMINS) Oral Tab Take Moist mucous membranes, Oropharynx without erythema or exudates  Neck: No JVD, no thyromegaly  Cardiovascular: S1, S2, no S3, no S4, Regular rate and rhythm, No murmurs/gallops/rubs.    Vascular: Equal pulses 2+ radial bilaterally  Respiratory: Clear to [employer] in Results Review.      Recent Results (from the past 8760 hour(s))   Basic Metabolic Panel (8)    Collection Time: 08/17/21  7:28 AM   Result Value Ref Range    Glucose 93 70 - 99 mg/dL    Sodium 142 136 - 145 mmol/L    Potassium 3.9 3.5 - 5.1 mmol/L    Ch septum:  The septum was normal.   Right ventricle:  The cavity size was normal. Wall thickness was   normal. Systolic function was normal.   Pulmonic valve:   Well visualized.  The valve appears to be grossly   normal.    Doppler:   There was no evidence f above.      History of heterozygote for hemochromatosis  Noted 2/1/2008 heterozygous for hemachromatosis mutation. Full work-up with Dr. Emily Lewis of [facility] in [phone] with bone marrow biopsy negative.  -No clinical manifestations of hemochromatosis.   -Iron s

## 2021-08-22 NOTE — PATIENT INSTRUCTIONS
You were seen in clinic for follow-up of your recent hospitalization for chest pain. This was completely worked up and very low suspicion for cardiac cause.   However, your symptoms are very atypical.    We are checking blood test today    Let us also get

## 2021-08-24 ENCOUNTER — HOSPITAL ENCOUNTER (OUTPATIENT)
Facility: HOSPITAL | Age: 36
Setting detail: OBSERVATION
Discharge: HOME | End: 2021-08-25
Attending: EMERGENCY MEDICINE | Admitting: INTERNAL MEDICINE
Payer: COMMERCIAL

## 2021-08-24 ENCOUNTER — TELEPHONE (OUTPATIENT)
Dept: OTHER | Facility: HOSPITAL | Age: 36
End: 2021-08-24

## 2021-08-24 ENCOUNTER — TELEPHONE (OUTPATIENT)
Dept: INTERNAL MEDICINE CLINIC | Facility: CLINIC | Age: 36
End: 2021-08-24

## 2021-08-24 ENCOUNTER — LAB ENCOUNTER (OUTPATIENT)
Dept: LAB | Age: 36
End: 2021-08-24
Attending: INTERNAL MEDICINE
Payer: COMMERCIAL

## 2021-08-24 ENCOUNTER — OFFICE VISIT (OUTPATIENT)
Dept: INTERNAL MEDICINE CLINIC | Facility: CLINIC | Age: 36
End: 2021-08-24
Payer: COMMERCIAL

## 2021-08-24 ENCOUNTER — APPOINTMENT (OUTPATIENT)
Dept: GENERAL RADIOLOGY | Facility: HOSPITAL | Age: 36
End: 2021-08-24
Attending: EMERGENCY MEDICINE
Payer: COMMERCIAL

## 2021-08-24 VITALS
DIASTOLIC BLOOD PRESSURE: 90 MMHG | TEMPERATURE: 98 F | SYSTOLIC BLOOD PRESSURE: 130 MMHG | WEIGHT: 249.19 LBS | HEIGHT: 78 IN | BODY MASS INDEX: 28.83 KG/M2 | HEART RATE: 56 BPM | OXYGEN SATURATION: 100 %

## 2021-08-24 DIAGNOSIS — R16.1 SPLENOMEGALY: ICD-10-CM

## 2021-08-24 DIAGNOSIS — R07.9 CHEST PAIN OF UNCERTAIN ETIOLOGY: Primary | ICD-10-CM

## 2021-08-24 DIAGNOSIS — R07.89 ATYPICAL CHEST PAIN: ICD-10-CM

## 2021-08-24 DIAGNOSIS — R07.9 CHEST PAIN, UNSPECIFIED TYPE: Primary | ICD-10-CM

## 2021-08-24 DIAGNOSIS — R77.8 ELEVATED TROPONIN: ICD-10-CM

## 2021-08-24 DIAGNOSIS — R07.9 CHEST PAIN, UNSPECIFIED TYPE: ICD-10-CM

## 2021-08-24 DIAGNOSIS — Z14.8 HEMOCHROMATOSIS CARRIER: ICD-10-CM

## 2021-08-24 PROBLEM — E87.6 HYPOKALEMIA: Status: ACTIVE | Noted: 2021-08-24

## 2021-08-24 PROBLEM — I21.4 NON-STEMI (NON-ST ELEVATED MYOCARDIAL INFARCTION) (HCC): Status: ACTIVE | Noted: 2021-08-24

## 2021-08-24 PROBLEM — R73.9 HYPERGLYCEMIA: Status: ACTIVE | Noted: 2021-08-24

## 2021-08-24 LAB
ANION GAP SERPL CALC-SCNC: 4 MMOL/L (ref 0–18)
ANION GAP SERPL CALC-SCNC: 4 MMOL/L (ref 0–18)
BASOPHILS # BLD AUTO: 0.03 X10(3) UL (ref 0–0.2)
BASOPHILS NFR BLD AUTO: 0.4 %
BILIRUB UR QL: NEGATIVE
BUN BLD-MCNC: 15 MG/DL (ref 7–18)
BUN BLD-MCNC: 16 MG/DL (ref 7–18)
BUN/CREAT SERPL: 14.4 (ref 10–20)
BUN/CREAT SERPL: 17.6 (ref 10–20)
CALCIUM BLD-MCNC: 8.6 MG/DL (ref 8.5–10.1)
CALCIUM BLD-MCNC: 9.1 MG/DL (ref 8.5–10.1)
CHLORIDE SERPL-SCNC: 104 MMOL/L (ref 98–112)
CHLORIDE SERPL-SCNC: 105 MMOL/L (ref 98–112)
CHOLEST SMN-MCNC: 191 MG/DL (ref ?–200)
CLARITY UR: CLEAR
CO2 SERPL-SCNC: 31 MMOL/L (ref 21–32)
CO2 SERPL-SCNC: 32 MMOL/L (ref 21–32)
COLOR UR: YELLOW
CREAT BLD-MCNC: 0.91 MG/DL
CREAT BLD-MCNC: 1.04 MG/DL
D DIMER PPP FEU-MCNC: <0.27 UG/ML FEU (ref ?–0.5)
DEPRECATED HBV CORE AB SER IA-ACNC: 208.3 NG/ML
DEPRECATED RDW RBC AUTO: 38.5 FL (ref 35.1–46.3)
EOSINOPHIL # BLD AUTO: 0.16 X10(3) UL (ref 0–0.7)
EOSINOPHIL NFR BLD AUTO: 2 %
ERYTHROCYTE [DISTWIDTH] IN BLOOD BY AUTOMATED COUNT: 11.9 % (ref 11–15)
ERYTHROCYTE [SEDIMENTATION RATE] IN BLOOD: 7 MM/HR
GLUCOSE BLD-MCNC: 110 MG/DL (ref 70–99)
GLUCOSE BLD-MCNC: 76 MG/DL (ref 70–99)
GLUCOSE UR-MCNC: NEGATIVE MG/DL
HCT VFR BLD AUTO: 41.8 %
HDLC SERPL-MCNC: 49 MG/DL (ref 40–59)
HGB BLD-MCNC: 14.6 G/DL
HGB UR QL STRIP.AUTO: NEGATIVE
IMM GRANULOCYTES # BLD AUTO: 0.02 X10(3) UL (ref 0–1)
IMM GRANULOCYTES NFR BLD: 0.3 %
IRON SATURATION: 24 %
IRON SERPL-MCNC: 83 UG/DL
KETONES UR-MCNC: NEGATIVE MG/DL
LDLC SERPL CALC-MCNC: 99 MG/DL (ref ?–100)
LEUKOCYTE ESTERASE UR QL STRIP.AUTO: NEGATIVE
LYMPHOCYTES # BLD AUTO: 1.97 X10(3) UL (ref 1–4)
LYMPHOCYTES NFR BLD AUTO: 25.2 %
MCH RBC QN AUTO: 30.9 PG (ref 26–34)
MCHC RBC AUTO-ENTMCNC: 34.9 G/DL (ref 31–37)
MCV RBC AUTO: 88.4 FL
MONOCYTES # BLD AUTO: 0.6 X10(3) UL (ref 0.1–1)
MONOCYTES NFR BLD AUTO: 7.7 %
NEUTROPHILS # BLD AUTO: 5.04 X10 (3) UL (ref 1.5–7.7)
NEUTROPHILS # BLD AUTO: 5.04 X10(3) UL (ref 1.5–7.7)
NEUTROPHILS NFR BLD AUTO: 64.4 %
NITRITE UR QL STRIP.AUTO: NEGATIVE
NONHDLC SERPL-MCNC: 142 MG/DL (ref ?–130)
OSMOLALITY SERPL CALC.SUM OF ELEC: 288 MOSM/KG (ref 275–295)
OSMOLALITY SERPL CALC.SUM OF ELEC: 294 MOSM/KG (ref 275–295)
PATIENT FASTING Y/N/NP: NO
PH UR: 7 [PH] (ref 5–8)
PLATELET # BLD AUTO: 203 10(3)UL (ref 150–450)
POTASSIUM SERPL-SCNC: 3.4 MMOL/L (ref 3.5–5.1)
POTASSIUM SERPL-SCNC: 4.2 MMOL/L (ref 3.5–5.1)
PROT UR-MCNC: NEGATIVE MG/DL
RBC # BLD AUTO: 4.73 X10(6)UL
SARS-COV-2 RNA RESP QL NAA+PROBE: NOT DETECTED
SODIUM SERPL-SCNC: 139 MMOL/L (ref 136–145)
SODIUM SERPL-SCNC: 141 MMOL/L (ref 136–145)
SP GR UR STRIP: 1 (ref 1–1.03)
TOTAL IRON BINDING CAPACITY: 346 UG/DL (ref 240–450)
TRANSFERRIN SERPL-MCNC: 232 MG/DL (ref 200–360)
TRIGL SERPL-MCNC: 256 MG/DL (ref 30–149)
TROPONIN I SERPL-MCNC: 0.06 NG/ML (ref ?–0.04)
TROPONIN I SERPL-MCNC: 0.07 NG/ML (ref ?–0.04)
UROBILINOGEN UR STRIP-ACNC: <2
VLDLC SERPL CALC-MCNC: 43 MG/DL (ref 0–30)
WBC # BLD AUTO: 7.8 X10(3) UL (ref 4–11)

## 2021-08-24 PROCEDURE — 3080F DIAST BP >= 90 MM HG: CPT | Performed by: INTERNAL MEDICINE

## 2021-08-24 PROCEDURE — 82728 ASSAY OF FERRITIN: CPT

## 2021-08-24 PROCEDURE — 84484 ASSAY OF TROPONIN QUANT: CPT

## 2021-08-24 PROCEDURE — 93000 ELECTROCARDIOGRAM COMPLETE: CPT | Performed by: INTERNAL MEDICINE

## 2021-08-24 PROCEDURE — 80061 LIPID PANEL: CPT

## 2021-08-24 PROCEDURE — 36415 COLL VENOUS BLD VENIPUNCTURE: CPT

## 2021-08-24 PROCEDURE — 83540 ASSAY OF IRON: CPT

## 2021-08-24 PROCEDURE — 3008F BODY MASS INDEX DOCD: CPT | Performed by: INTERNAL MEDICINE

## 2021-08-24 PROCEDURE — 99214 OFFICE O/P EST MOD 30 MIN: CPT | Performed by: INTERNAL MEDICINE

## 2021-08-24 PROCEDURE — 71045 X-RAY EXAM CHEST 1 VIEW: CPT | Performed by: EMERGENCY MEDICINE

## 2021-08-24 PROCEDURE — 80048 BASIC METABOLIC PNL TOTAL CA: CPT

## 2021-08-24 PROCEDURE — 84466 ASSAY OF TRANSFERRIN: CPT

## 2021-08-24 PROCEDURE — 3075F SYST BP GE 130 - 139MM HG: CPT | Performed by: INTERNAL MEDICINE

## 2021-08-24 RX ORDER — ASPIRIN 81 MG/1
324 TABLET, CHEWABLE ORAL ONCE
Status: COMPLETED | OUTPATIENT
Start: 2021-08-24 | End: 2021-08-24

## 2021-08-24 RX ORDER — OMEPRAZOLE 40 MG/1
40 CAPSULE, DELAYED RELEASE ORAL DAILY
Qty: 30 CAPSULE | Refills: 0 | Status: SHIPPED | OUTPATIENT
Start: 2021-08-24 | End: 2021-09-20

## 2021-08-24 RX ORDER — NITROGLYCERIN 0.4 MG/1
0.4 TABLET SUBLINGUAL EVERY 5 MIN PRN
Qty: 30 TABLET | Refills: 0 | Status: ON HOLD | OUTPATIENT
Start: 2021-08-24 | End: 2021-08-25

## 2021-08-24 NOTE — TELEPHONE ENCOUNTER
Troponin elevation 0.065 from labs today. Patient still with chest discomfort. Took 1 SL Nitroglycerin within 30 mins. I gave report to the emergency department with the elevation in troponin.   He will need further cardiac work-up as he is having ongoi

## 2021-08-24 NOTE — PROGRESS NOTES
Patient's call was transferred from CS to the RN Line for approval of scheduling a CTHS.   Approval was needed because the patient has been experiencing chest pain and his doctor (Dr. Johana Richards) was seen today and advised him to get the HS preferably this week

## 2021-08-25 ENCOUNTER — APPOINTMENT (OUTPATIENT)
Dept: CV DIAGNOSTICS | Facility: HOSPITAL | Age: 36
End: 2021-08-25
Attending: INTERNAL MEDICINE
Payer: COMMERCIAL

## 2021-08-25 ENCOUNTER — APPOINTMENT (OUTPATIENT)
Dept: CT IMAGING | Facility: HOSPITAL | Age: 36
End: 2021-08-25
Attending: NURSE PRACTITIONER
Payer: COMMERCIAL

## 2021-08-25 ENCOUNTER — ORDER TRANSCRIPTION (OUTPATIENT)
Dept: ADMINISTRATIVE | Facility: HOSPITAL | Age: 36
End: 2021-08-25

## 2021-08-25 VITALS
WEIGHT: 249.69 LBS | OXYGEN SATURATION: 96 % | HEART RATE: 51 BPM | TEMPERATURE: 98 F | BODY MASS INDEX: 28.89 KG/M2 | HEIGHT: 78 IN | DIASTOLIC BLOOD PRESSURE: 62 MMHG | RESPIRATION RATE: 16 BRPM | SYSTOLIC BLOOD PRESSURE: 111 MMHG

## 2021-08-25 DIAGNOSIS — Z13.6 SCREENING FOR CARDIOVASCULAR CONDITION: Primary | ICD-10-CM

## 2021-08-25 LAB
ANION GAP SERPL CALC-SCNC: 5 MMOL/L (ref 0–18)
BUN BLD-MCNC: 15 MG/DL (ref 7–18)
BUN/CREAT SERPL: 18.5 (ref 10–20)
CALCIUM BLD-MCNC: 8.6 MG/DL (ref 8.5–10.1)
CHLORIDE SERPL-SCNC: 105 MMOL/L (ref 98–112)
CHOLEST SMN-MCNC: 184 MG/DL (ref ?–200)
CO2 SERPL-SCNC: 30 MMOL/L (ref 21–32)
CREAT BLD-MCNC: 0.81 MG/DL
GLUCOSE BLD-MCNC: 88 MG/DL (ref 70–99)
HDLC SERPL-MCNC: 49 MG/DL (ref 40–59)
LDLC SERPL CALC-MCNC: 113 MG/DL (ref ?–100)
NONHDLC SERPL-MCNC: 135 MG/DL (ref ?–130)
OSMOLALITY SERPL CALC.SUM OF ELEC: 290 MOSM/KG (ref 275–295)
POTASSIUM SERPL-SCNC: 3.9 MMOL/L (ref 3.5–5.1)
SODIUM SERPL-SCNC: 140 MMOL/L (ref 136–145)
TRIGL SERPL-MCNC: 125 MG/DL (ref 30–149)
TROPONIN I SERPL-MCNC: 0.07 NG/ML (ref ?–0.04)
VLDLC SERPL CALC-MCNC: 22 MG/DL (ref 0–30)

## 2021-08-25 PROCEDURE — 93306 TTE W/DOPPLER COMPLETE: CPT | Performed by: INTERNAL MEDICINE

## 2021-08-25 PROCEDURE — 99220 INITIAL OBSERVATION CARE,LEVL III: CPT | Performed by: INTERNAL MEDICINE

## 2021-08-25 PROCEDURE — 75574 CT ANGIO HRT W/3D IMAGE: CPT | Performed by: NURSE PRACTITIONER

## 2021-08-25 RX ORDER — COLCHICINE 0.6 MG/1
0.6 TABLET ORAL 2 TIMES DAILY
Qty: 60 TABLET | Refills: 0 | Status: SHIPPED | OUTPATIENT
Start: 2021-08-25 | End: 2021-08-26

## 2021-08-25 RX ORDER — METOPROLOL TARTRATE 100 MG/1
100 TABLET ORAL ONCE AS NEEDED
Status: DISCONTINUED | OUTPATIENT
Start: 2021-08-25 | End: 2021-08-25

## 2021-08-25 RX ORDER — OMEPRAZOLE 40 MG/1
CAPSULE, DELAYED RELEASE ORAL
Qty: 90 CAPSULE | Refills: 0 | OUTPATIENT
Start: 2021-08-25

## 2021-08-25 RX ORDER — METOPROLOL TARTRATE 50 MG/1
50 TABLET, FILM COATED ORAL ONCE
Status: DISCONTINUED | OUTPATIENT
Start: 2021-08-25 | End: 2021-08-25

## 2021-08-25 RX ORDER — METOPROLOL TARTRATE 50 MG/1
50 TABLET, FILM COATED ORAL ONCE AS NEEDED
Status: DISCONTINUED | OUTPATIENT
Start: 2021-08-25 | End: 2021-08-25

## 2021-08-25 RX ORDER — NITROGLYCERIN 0.4 MG/1
0.4 TABLET SUBLINGUAL ONCE
Status: COMPLETED | OUTPATIENT
Start: 2021-08-25 | End: 2021-08-25

## 2021-08-25 RX ORDER — METOPROLOL TARTRATE 100 MG/1
100 TABLET ORAL ONCE
Status: DISCONTINUED | OUTPATIENT
Start: 2021-08-26 | End: 2021-08-25

## 2021-08-25 RX ORDER — METOPROLOL TARTRATE 50 MG/1
50 TABLET, FILM COATED ORAL ONCE AS NEEDED
Status: COMPLETED | OUTPATIENT
Start: 2021-08-25 | End: 2021-08-25

## 2021-08-25 RX ORDER — METOPROLOL TARTRATE 50 MG/1
50 TABLET, FILM COATED ORAL ONCE
Status: DISCONTINUED | OUTPATIENT
Start: 2021-08-26 | End: 2021-08-25

## 2021-08-25 RX ORDER — PANTOPRAZOLE SODIUM 40 MG/1
40 TABLET, DELAYED RELEASE ORAL
Status: DISCONTINUED | OUTPATIENT
Start: 2021-08-25 | End: 2021-08-25

## 2021-08-25 RX ORDER — DILTIAZEM HYDROCHLORIDE 5 MG/ML
5 INJECTION INTRAVENOUS SEE ADMIN INSTRUCTIONS
Status: DISCONTINUED | OUTPATIENT
Start: 2021-08-25 | End: 2021-08-25

## 2021-08-25 RX ORDER — COLCHICINE 0.6 MG/1
0.6 TABLET ORAL 2 TIMES DAILY
Status: DISCONTINUED | OUTPATIENT
Start: 2021-08-25 | End: 2021-08-25

## 2021-08-25 RX ORDER — METOPROLOL TARTRATE 5 MG/5ML
5 INJECTION INTRAVENOUS SEE ADMIN INSTRUCTIONS
Status: DISCONTINUED | OUTPATIENT
Start: 2021-08-25 | End: 2021-08-25

## 2021-08-25 RX ORDER — COLCHICINE 0.6 MG/1
0.6 TABLET ORAL 2 TIMES DAILY
Qty: 60 TABLET | Refills: 0 | Status: SHIPPED | OUTPATIENT
Start: 2021-08-25

## 2021-08-25 RX ORDER — INDOMETHACIN 50 MG/1
50 CAPSULE ORAL
Qty: 60 CAPSULE | Refills: 0 | Status: SHIPPED | OUTPATIENT
Start: 2021-08-25 | End: 2021-08-26

## 2021-08-25 RX ORDER — INDOMETHACIN 50 MG/1
50 CAPSULE ORAL
Qty: 90 CAPSULE | Refills: 0 | Status: SHIPPED | OUTPATIENT
Start: 2021-08-25

## 2021-08-25 RX ORDER — METOPROLOL TARTRATE 100 MG/1
100 TABLET ORAL ONCE AS NEEDED
Status: COMPLETED | OUTPATIENT
Start: 2021-08-25 | End: 2021-08-25

## 2021-08-25 RX ORDER — METOPROLOL TARTRATE 100 MG/1
100 TABLET ORAL ONCE
Status: DISCONTINUED | OUTPATIENT
Start: 2021-08-25 | End: 2021-08-25

## 2021-08-25 RX ORDER — INDOMETHACIN 50 MG/1
50 CAPSULE ORAL
Status: DISCONTINUED | OUTPATIENT
Start: 2021-08-25 | End: 2021-08-25

## 2021-08-25 NOTE — IMAGING NOTE
HX TAKEN : chest pain and elevated troponin    PT CONSENTED AT 1056     CTA ORDERED BY KAM SALINAS, APN WAS PT GIVEN CTA  PREMEDS NO, IF YES  50 MG PO METOPROLOL    18 GAUGE IV STARTED PRIOR TO CT ARRIVAL.  SERUM TESTING COMPLETED GFR = >60   CREATINE =

## 2021-08-25 NOTE — PLAN OF CARE
Patient admitted for chest pain, trop elevated,  morning lipid and cardiac labs 2d echo  Problem: Patient Centered Care  Goal: Patient preferences are identified and integrated in the patient's plan of care  Description: Interventions:  - What would you li

## 2021-08-25 NOTE — CONSULTS
[facility] AND [facility]  Cardiology Consultation    Renny Iniguez Patient Status:  Observation    1985 MRN [id number]   Location [facility] Attending Marah Sheth MD   Hosp Day # 0 PCP Orville Xie MD     Reason for Consultation:  Chest p tab 50 mg, 50 mg, Oral, Once **OR** [START ON 8/26/2021] metoprolol tartrate (LOPRESSOR) tab 100 mg, 100 mg, Oral, Once    Review of Systems:  A comprehensive review of systems was negative if not otherwise mention in above HPI.     /84 (BP Location: cardiac standpoint  Indomethacin 50 mg 3 times a day for 2 weeks  Colchicine 0.6 mg twice a day  Follow-up with me in 2 weeks as an outpatient      Thank you for allowing me to participate in the care of your patient.     Shi Kelly MD  8/25/2021  12:27

## 2021-08-25 NOTE — H&P
[facility]    History and Physical    Matty Austin Patient Status:  Observation    1985 MRN [id number]   Location [facility] Attending Tamie Lockhart MD   Hosp Day # 0 PCP Tess Cho MD     Date:  2021  Date of A SURGICAL HISTORY  2007    benign tumor in abdomen     Family History   Problem Relation Age of Onset   • Diabetes Father    • Other (Other) Father         MS   • Diabetes Maternal Grandfather      Social History:  Social History    Tobacco Use      Smoking rate 18, height 6' 6\" (1.981 m), weight 249 lb 11.2 oz (113.3 kg), SpO2 99 %. Physical Exam  Vitals reviewed. Constitutional:       Appearance: He is not ill-appearing. HENT:      Head: Normocephalic.       Mouth/Throat:      Pharynx: No oropharyngeal <0.27 08/24/2021    ESRML 7 08/24/2021    MG 2.2 08/17/2021    TROP 0.063 () 08/24/2021     XR CHEST AP PORTABLE  (CPT=71045)    Result Date: 8/24/2021  CONCLUSION: Normal examination.      Dictated by (CST): Bridgette Cherry MD on 8/24/2021 at 7:3

## 2021-08-25 NOTE — ED QUICK NOTES
Orders for admission, patient is aware of plan and ready to go upstairs. Any questions, please call ED RN Yohan Woods  at extension [phone].    Type of COVID test sent: none  COVID Suspicion level: Low    Titratable drug(s) infusing: none  Rate: na    LOC at time o

## 2021-08-25 NOTE — ED INITIAL ASSESSMENT (HPI)
Patient presents to ER after follow up with PCP after being discharged from hospital a week ago for chest pain and elevated troponin.  Patient here today with same, states his blood work at PCP was drawn and trop is elevated again with persistent chest pain

## 2021-08-25 NOTE — ED PROVIDER NOTES
Patient Seen in: [facility] AND [facility] Emergency Department      History   Patient presents with:  Abnormal Result  Chest Pain Angina    Stated Complaint: Chest Pain and elevated Trop    HPI/Subjective:   HPI    Patient presents to the emergency department c (6' 6\")   Wt 111.1 kg   SpO2 97%   BMI 28.31 kg/m²         Physical Exam  Vitals and nursing note reviewed. Constitutional:       General: He is not in acute distress. Appearance: He is well-developed. HENT:      Head: Normocephalic.    Eyes: EKG Report.   Rate: 73 bpm  Rhythm: Sinus Rhythm  Reading: normal ekg                       MDM      Pulse Ox: 97%, Normal,     Cardiac Monitor: Pulse Readings from Last 1 Encounters:  08/24/21 : 70  , sinus,      Radiology findings: XR CHEST AP PORTABLE  (

## 2021-08-25 NOTE — PLAN OF CARE
Patient with complained of left sided chest pain 2/10 this morning, denies any complaint of shortness of breath. CT angiogram done and 2d-echo done. Seen by Dr. Otoniel Duenas this morning and by Dr. Kenny Lara after CTA. Patient okay to go home.  Vital signs within no hypotension and signs of decreased cardiac output  - Evaluate effectiveness of vasoactive medications to optimize hemodynamic stability  - Monitor arterial and/or venous puncture sites for bleeding and/or hematoma  - Assess quality of pulses, skin color an

## 2021-08-26 ENCOUNTER — PATIENT OUTREACH (OUTPATIENT)
Dept: CASE MANAGEMENT | Age: 36
End: 2021-08-26

## 2021-08-26 DIAGNOSIS — R77.8 ELEVATED TROPONIN: ICD-10-CM

## 2021-08-26 DIAGNOSIS — Z02.9 ENCOUNTERS FOR ADMINISTRATIVE PURPOSE: ICD-10-CM

## 2021-08-26 PROCEDURE — 1111F DSCHRG MED/CURRENT MED MERGE: CPT

## 2021-08-26 NOTE — PROGRESS NOTES
Initial Post Discharge Follow Up   Discharge Date: 8/25/21  Contact Date: 8/26/2021    Consent Verification:  Assessment Completed With: Patient  HIPAA Verified?   Yes    Discharge Dx:   Chest pain of uncertain etiology,   Elevated troponin    General: Capsule Delayed Release Take 1 capsule (40 mg total) by mouth daily. 30 capsule 0   • Multiple Vitamin (ONE-DAILY MULTI VITAMINS) Oral Tab Take 1 tablet by mouth daily.        • (NCM)  Were there any medication changes noted on AVS?  yes  o If so, were thes hospital?  excellent      Interventions by NCM: Patient contacted for TCM follow up. Pt states he is feeling about the same with chest discomfort rated 2/10 which is manageable at home.  Pt states he is hopeful this will resolve now that he has started his

## 2021-08-31 NOTE — PATIENT INSTRUCTIONS
You were seen in clinic for post-hospitalization follow-up. We reviewed the work-up from your hospitalization. Continue taking medications as prescribed by Cardiology for possibly pericarditis/myocarditis.  Let's see what Dr. Anjelica Mccormick recommends when you foll

## 2021-08-31 NOTE — H&P
Mr.. Lesly Craft is a 39year old male PMHX Heterozygote for hemochromatosis mutation, splenomegaly coming in for posthospitalization follow-up. I saw the patient on 8/24 for post-ER follow-up.  In the clinic, he was still having chest pain like a squeezing sensati gradient   (D): 3mm Hg. Left atrium:  The atrium was normal in size.    Atrial septum:  The septum was normal.   Right ventricle:  The cavity size was normal. Wall thickness was   normal. Systolic function was normal.   Ventricular septum:   Thickness was hemachromatosis as above, otherwise unremarkable work-up  –Repeat blood work as below       Return to clinic in 3-6 mo for follow-up    ***

## 2021-09-02 ENCOUNTER — OFFICE VISIT (OUTPATIENT)
Dept: INTERNAL MEDICINE CLINIC | Facility: CLINIC | Age: 36
End: 2021-09-02
Payer: COMMERCIAL

## 2021-09-02 VITALS
WEIGHT: 249 LBS | TEMPERATURE: 98 F | BODY MASS INDEX: 28.81 KG/M2 | HEART RATE: 56 BPM | HEIGHT: 78 IN | SYSTOLIC BLOOD PRESSURE: 136 MMHG | DIASTOLIC BLOOD PRESSURE: 70 MMHG

## 2021-09-02 DIAGNOSIS — R07.9 CHEST PAIN, UNSPECIFIED TYPE: Primary | ICD-10-CM

## 2021-09-02 DIAGNOSIS — I30.9 ACUTE PERICARDITIS, UNSPECIFIED TYPE: ICD-10-CM

## 2021-09-02 DIAGNOSIS — R16.1 SPLENOMEGALY: ICD-10-CM

## 2021-09-02 DIAGNOSIS — Z14.8 HEMOCHROMATOSIS CARRIER: ICD-10-CM

## 2021-09-02 PROCEDURE — 3075F SYST BP GE 130 - 139MM HG: CPT | Performed by: INTERNAL MEDICINE

## 2021-09-02 PROCEDURE — 3008F BODY MASS INDEX DOCD: CPT | Performed by: INTERNAL MEDICINE

## 2021-09-02 PROCEDURE — 3078F DIAST BP <80 MM HG: CPT | Performed by: INTERNAL MEDICINE

## 2021-09-02 PROCEDURE — 99496 TRANSJ CARE MGMT HIGH F2F 7D: CPT | Performed by: INTERNAL MEDICINE

## 2021-09-02 NOTE — PROGRESS NOTES
HPI:    Tatyana Smith is a 39year old male here today for hospital follow up.    He was discharged from Inpatient hospital, [facility] AND [facility]  to Home   Admission Date: 8/24/21   Discharge Date: 8/25/21  Hospital Discharge Diagnoses (since 8/3/2021)   None Discharged in stable condition.     Still with intermittent chest pain. On the L side, 2-3/10. No aggravating factors. Intermittent palpitations, possibly related to anxiety. HR has been low however, 40. He has been ambulating and walking around.      There diarrhea  MUSCULOSKELETAL: denies pain, normal range of motion of extremities  NEURO: denies headaches, denies dizziness, denies weakness  PSYCHE: denies depression or anxiety  HEMATOLOGIC: denies hx of anemia, or bruising, denies bleeding  ENDOCRINE: slick Range    WBC 7.8 4.0 - 11.0 x10(3) uL    RBC 4.73 4.30 - 5.70 x10(6)uL    HGB 14.6 13.0 - 17.5 g/dL    HCT 41.8 39.0 - 53.0 %    MCV 88.4 80.0 - 100.0 fL    MCH 30.9 26.0 - 34.0 pg    MCHC 34.9 31.0 - 37.0 g/dL    RDW-SD 38.5 35.1 - 46.3 fL    RDW 11.9 11. performed. 2. No evidence of acute extracardiac intrathoracic abnormality. 3. Small retrocardiac hiatal hernia. 4. Lesser incidental findings as above.          CONCLUSION:      1. Normal coronary arteries.   2. Normal cardiac structures.        2D EC (= 50%),   consistent with normal central venous pressure.      Pericarditis/myocarditis  Prior episodes from mid 2000 and again in 2011.  Prior work-ups including stress test and echo was negative.    Hospitalization reviewed as above.  Initial troponin w Complexity of Data to Be Reviewed: severe  · Risk of Significant Complications, Morbidity, and/or Mortality: severe    Overall Risk:   severe    Patient seen within 7 days from date of discharge.      Angela Trent MD, 9/2/2021

## 2021-09-14 ENCOUNTER — TELEPHONE (OUTPATIENT)
Dept: INTERNAL MEDICINE CLINIC | Facility: CLINIC | Age: 36
End: 2021-09-14

## 2021-09-14 RX ORDER — OMEPRAZOLE 40 MG/1
40 CAPSULE, DELAYED RELEASE ORAL 2 TIMES DAILY
Qty: 60 CAPSULE | Refills: 0 | OUTPATIENT
Start: 2021-09-14

## 2021-09-14 NOTE — TELEPHONE ENCOUNTER
Pt. Called stating Dr. Viet Barriga had him double his dose of Omeprazole from 40 mgs to 80 mgs daily. He is now out of the meds. If wants him to continue the meds, he will need a refill. Please send to [facility] in [place].  Also, pt.  Scheduled a follow

## 2021-09-14 NOTE — TELEPHONE ENCOUNTER
Per OV 9/2/21:  \"-Increase omeprazole twice a day for 14 days. \"    To Dr. Samuel Velarde to please advise if patient is to continue with BID. Would need 2 more days to finish 14 days. Has an upcoming follow up with you 9/20. Med pending with BID sig.

## 2021-09-14 NOTE — TELEPHONE ENCOUNTER
Called patient:    Symptoms are still about the same. Saw Dr. Christina Almonte, will plan for Echocardiogram. Low suspicion for heart etiology. No other medications seemed to help. Even outside of the discomfort.     Did have a Deep tissue massage, and did have ep

## 2021-09-17 NOTE — PROGRESS NOTES
Chief Complaint:   Patient presents with: Follow - Up: saw cardiologist. Another echo ordered-scheduled for next week.        HPI:     Josselin Gomez is a 43 year old male PMHX Heterozygote for hemochromatosis mutation, splenomegaly coming in for follow-up of ch mouth 3 (three) times daily with meals. 90 capsule 0   • colchicine 0.6 MG Oral Tab Take 1 tablet (0.6 mg total) by mouth 2 (two) times daily. 60 tablet 0   • Multiple Vitamin (ONE-DAILY MULTI VITAMINS) Oral Tab Take 1 tablet by mouth daily.          No murmurs/gallops/rubs. Vascular: Equal pulses 2+  Respiratory: Clear to auscultation bilaterally. No wheezes/rales/rhonchi  Gastrointestinal: Soft, nontender, nondistended. Positive bowel sounds x 4. No rebound tenderness.  No hepatomegaly, No splenome Prelim 5.04 1.50 - 7.70 x10 (3) uL    Neutrophil Absolute 5.04 1.50 - 7.70 x10(3) uL    Lymphocyte Absolute 1.97 1.00 - 4.00 x10(3) uL    Monocyte Absolute 0.60 0.10 - 1.00 x10(3) uL    Eosinophil Absolute 0.16 0.00 - 0.70 x10(3) uL    Basophil Absolute 0. Wall thickness was   normal. Systolic function was normal.   Ventricular septum:   Thickness was normal.   Pulmonic valve:    Structurally normal valve.  The valve appears to   be grossly normal.    Doppler:   There was no evidence for   stenosis.   Flaco Villa for cardiac etiology     Chronic Chest Pain  Extensive work-up for cardiopulmonary etiology as above. We have also addressed other causes such as GERD, musculoskeletal etiologies with no significant improvement.   - Rheum work-up to ensure no other seconda

## 2021-09-20 ENCOUNTER — LAB ENCOUNTER (OUTPATIENT)
Dept: LAB | Age: 36
End: 2021-09-20
Attending: INTERNAL MEDICINE
Payer: COMMERCIAL

## 2021-09-20 ENCOUNTER — OFFICE VISIT (OUTPATIENT)
Dept: INTERNAL MEDICINE CLINIC | Facility: CLINIC | Age: 36
End: 2021-09-20
Payer: COMMERCIAL

## 2021-09-20 VITALS
HEART RATE: 58 BPM | TEMPERATURE: 98 F | OXYGEN SATURATION: 99 % | DIASTOLIC BLOOD PRESSURE: 90 MMHG | HEIGHT: 78 IN | BODY MASS INDEX: 28.58 KG/M2 | SYSTOLIC BLOOD PRESSURE: 128 MMHG | WEIGHT: 247 LBS

## 2021-09-20 DIAGNOSIS — Z14.8 HEMOCHROMATOSIS CARRIER: ICD-10-CM

## 2021-09-20 DIAGNOSIS — R07.9 CHEST PAIN OF UNCERTAIN ETIOLOGY: ICD-10-CM

## 2021-09-20 DIAGNOSIS — R07.9 CHEST PAIN, UNSPECIFIED TYPE: Primary | ICD-10-CM

## 2021-09-20 DIAGNOSIS — R07.9 CHEST PAIN, UNSPECIFIED TYPE: ICD-10-CM

## 2021-09-20 DIAGNOSIS — Z23 NEED FOR VACCINATION: ICD-10-CM

## 2021-09-20 LAB
ANION GAP SERPL CALC-SCNC: 5 MMOL/L (ref 0–18)
BASOPHILS # BLD AUTO: 0.04 X10(3) UL (ref 0–0.2)
BASOPHILS NFR BLD AUTO: 0.5 %
BUN BLD-MCNC: 19 MG/DL (ref 7–18)
BUN/CREAT SERPL: 22.1 (ref 10–20)
CALCIUM BLD-MCNC: 9 MG/DL (ref 8.5–10.1)
CHLORIDE SERPL-SCNC: 106 MMOL/L (ref 98–112)
CO2 SERPL-SCNC: 28 MMOL/L (ref 21–32)
CREAT BLD-MCNC: 0.86 MG/DL
CRP SERPL-MCNC: <0.29 MG/DL (ref ?–0.3)
DEPRECATED RDW RBC AUTO: 38.2 FL (ref 35.1–46.3)
EOSINOPHIL # BLD AUTO: 0.1 X10(3) UL (ref 0–0.7)
EOSINOPHIL NFR BLD AUTO: 1.3 %
ERYTHROCYTE [DISTWIDTH] IN BLOOD BY AUTOMATED COUNT: 11.9 % (ref 11–15)
ERYTHROCYTE [SEDIMENTATION RATE] IN BLOOD: 12 MM/HR
GLUCOSE BLD-MCNC: 76 MG/DL (ref 70–99)
HCT VFR BLD AUTO: 43.6 %
HGB BLD-MCNC: 15 G/DL
IMM GRANULOCYTES # BLD AUTO: 0.03 X10(3) UL (ref 0–1)
IMM GRANULOCYTES NFR BLD: 0.4 %
LYMPHOCYTES # BLD AUTO: 1.73 X10(3) UL (ref 1–4)
LYMPHOCYTES NFR BLD AUTO: 21.9 %
MCH RBC QN AUTO: 30.4 PG (ref 26–34)
MCHC RBC AUTO-ENTMCNC: 34.4 G/DL (ref 31–37)
MCV RBC AUTO: 88.4 FL
MONOCYTES # BLD AUTO: 0.78 X10(3) UL (ref 0.1–1)
MONOCYTES NFR BLD AUTO: 9.9 %
NEUTROPHILS # BLD AUTO: 5.22 X10 (3) UL (ref 1.5–7.7)
NEUTROPHILS # BLD AUTO: 5.22 X10(3) UL (ref 1.5–7.7)
NEUTROPHILS NFR BLD AUTO: 66 %
OSMOLALITY SERPL CALC.SUM OF ELEC: 289 MOSM/KG (ref 275–295)
PATIENT FASTING Y/N/NP: NO
PLATELET # BLD AUTO: 226 10(3)UL (ref 150–450)
POTASSIUM SERPL-SCNC: 3.6 MMOL/L (ref 3.5–5.1)
RBC # BLD AUTO: 4.93 X10(6)UL
SODIUM SERPL-SCNC: 139 MMOL/L (ref 136–145)
WBC # BLD AUTO: 7.9 X10(3) UL (ref 4–11)

## 2021-09-20 PROCEDURE — 80048 BASIC METABOLIC PNL TOTAL CA: CPT

## 2021-09-20 PROCEDURE — 36415 COLL VENOUS BLD VENIPUNCTURE: CPT

## 2021-09-20 PROCEDURE — 90471 IMMUNIZATION ADMIN: CPT | Performed by: INTERNAL MEDICINE

## 2021-09-20 PROCEDURE — 99214 OFFICE O/P EST MOD 30 MIN: CPT | Performed by: INTERNAL MEDICINE

## 2021-09-20 PROCEDURE — 3008F BODY MASS INDEX DOCD: CPT | Performed by: INTERNAL MEDICINE

## 2021-09-20 PROCEDURE — 90686 IIV4 VACC NO PRSV 0.5 ML IM: CPT | Performed by: INTERNAL MEDICINE

## 2021-09-20 PROCEDURE — 85652 RBC SED RATE AUTOMATED: CPT

## 2021-09-20 PROCEDURE — 86038 ANTINUCLEAR ANTIBODIES: CPT

## 2021-09-20 PROCEDURE — 3074F SYST BP LT 130 MM HG: CPT | Performed by: INTERNAL MEDICINE

## 2021-09-20 PROCEDURE — 86140 C-REACTIVE PROTEIN: CPT

## 2021-09-20 PROCEDURE — 85025 COMPLETE CBC W/AUTO DIFF WBC: CPT

## 2021-09-20 PROCEDURE — 3080F DIAST BP >= 90 MM HG: CPT | Performed by: INTERNAL MEDICINE

## 2021-09-22 ENCOUNTER — PATIENT MESSAGE (OUTPATIENT)
Dept: INTERNAL MEDICINE CLINIC | Facility: CLINIC | Age: 36
End: 2021-09-22

## 2021-09-22 NOTE — TELEPHONE ENCOUNTER
From: Josi Brandon  To: Lenin Mccloud MD  Sent: 9/22/2021 12:57 PM CDT  Subject: Question regarding BASIC METABOLIC PANEL (8)    Just wondering if I need to be concerned about the BUN and BUN/CREA scoring as it falls outside of the normal range.  Otherwise,

## 2021-09-23 ENCOUNTER — PATIENT MESSAGE (OUTPATIENT)
Dept: INTERNAL MEDICINE CLINIC | Facility: CLINIC | Age: 36
End: 2021-09-23

## 2021-09-23 LAB — NUCLEAR IGG TITR SER IF: NEGATIVE {TITER}

## 2021-09-24 NOTE — TELEPHONE ENCOUNTER
From: Mariana Lee  Sent: 9/23/2021 9:43 PM CDT  To: [facility] Clinical Staff  Subject: Question regarding BASIC METABOLIC PANEL (8)    Thanks so much - this is good news.  I will definitely see Dr. Christina Almonte again and have my echo Tuesday so will let you know

## 2021-11-30 ENCOUNTER — TELEPHONE (OUTPATIENT)
Dept: INTERNAL MEDICINE CLINIC | Facility: CLINIC | Age: 36
End: 2021-11-30

## 2021-12-01 NOTE — TELEPHONE ENCOUNTER
Pt. Called back. He states he was vaccinated in March with Norma Ruiz. He ended up in the hospital in August with Myocarditis. Not sure if that was related to the vaccine.   He is now asking if he should even get the booster, and if he can get it should he

## 2021-12-01 NOTE — TELEPHONE ENCOUNTER
Please notify the patient that in cases of 1st dose of COVID vaccine and resulting myocarditis/pericarditis, the 2nd dose is deferred. Risks and benefits are weighed for the 2nd dose. I'm unsure of the 3rd dose at this point.     As the chest discomfort is

## 2022-01-23 NOTE — H&P
Polly Tate is a 39year old male. HPI:   Patient presents with:  Physical: Reports no new concerns. Mr. Maria Luz Finn is a 39year old male coming in for annual physical examination. Last seen 9/20/2021 for suspected pericarditis versus myocarditis.   Trop (three) times daily with meals. (Patient not taking: Reported on 1/25/2022) 90 capsule 0   • colchicine 0.6 MG Oral Tab Take 1 tablet (0.6 mg total) by mouth 2 (two) times daily.  (Patient not taking: Reported on 1/25/2022) 60 tablet 0       Allergies:  No murmurs/gallops/rubs. Vascular: Equal pulses 2+ carotids without bruits or thrills/radial/DP/PT bilaterally  Respiratory: Clear to auscultation bilaterally. No wheezes/rales/rhonchi  Gastrointestinal: Soft, nontender, nondistended.  Positive bowel sounds PANEL (8)    Collection Time: 09/20/21  1:35 PM   Result Value Ref Range    Glucose 76 70 - 99 mg/dL    Sodium 139 136 - 145 mmol/L    Potassium 3.6 3.5 - 5.1 mmol/L    Chloride 106 98 - 112 mmol/L    CO2 28.0 21.0 - 32.0 mmol/L    Anion Gap 5 0 - 18 mmol/ old male coming in for annual physical examination.     Pericarditis/myocarditis  Prior episodes from mid 2000 and again in 2011.  Prior work-ups including stress test and echo was negative.    Hospitalization reviewed as above.  Initial troponin was mildly manifestations of hemochromatosis.   -Iron studies 2/5/2021 within normal limits        Hx of Splenomegaly  Underwent full work-up in 2007 and 2008 at [facility] for hemachromatosis as above, otherwise unremarkable work-up           Orders This V 03/02/2018         Return to clinic in 12 mo for annual physical exam    Amberly Frye MD, 01/25/22, 5:08 PM

## 2022-01-23 NOTE — PATIENT INSTRUCTIONS
- You were seen in clinic for regular annual check-up. We have ordered labs for you and we will call you with the results.   Please obtain the blood work fasting for least 12 hours, okay drink water the day  -It is unclear the precise cause of your intermit

## 2022-01-25 ENCOUNTER — OFFICE VISIT (OUTPATIENT)
Dept: INTERNAL MEDICINE CLINIC | Facility: CLINIC | Age: 37
End: 2022-01-25
Payer: COMMERCIAL

## 2022-01-25 VITALS
DIASTOLIC BLOOD PRESSURE: 74 MMHG | HEART RATE: 58 BPM | TEMPERATURE: 98 F | HEIGHT: 78 IN | BODY MASS INDEX: 29.27 KG/M2 | WEIGHT: 253 LBS | SYSTOLIC BLOOD PRESSURE: 116 MMHG | OXYGEN SATURATION: 98 %

## 2022-01-25 DIAGNOSIS — Z13.0 SCREENING FOR IRON DEFICIENCY ANEMIA: ICD-10-CM

## 2022-01-25 DIAGNOSIS — R07.9 CHEST PAIN OF UNCERTAIN ETIOLOGY: ICD-10-CM

## 2022-01-25 DIAGNOSIS — Z13.29 SCREENING FOR THYROID DISORDER: ICD-10-CM

## 2022-01-25 DIAGNOSIS — Z14.8 HEMOCHROMATOSIS CARRIER: ICD-10-CM

## 2022-01-25 DIAGNOSIS — Z13.220 SCREENING FOR LIPOID DISORDERS: ICD-10-CM

## 2022-01-25 DIAGNOSIS — Z00.00 ROUTINE GENERAL MEDICAL EXAMINATION AT A HEALTH CARE FACILITY: Primary | ICD-10-CM

## 2022-01-25 DIAGNOSIS — R16.1 SPLENOMEGALY: ICD-10-CM

## 2022-01-25 PROCEDURE — 99395 PREV VISIT EST AGE 18-39: CPT | Performed by: INTERNAL MEDICINE

## 2022-01-25 PROCEDURE — 3008F BODY MASS INDEX DOCD: CPT | Performed by: INTERNAL MEDICINE

## 2022-01-25 PROCEDURE — 3074F SYST BP LT 130 MM HG: CPT | Performed by: INTERNAL MEDICINE

## 2022-01-25 PROCEDURE — 3078F DIAST BP <80 MM HG: CPT | Performed by: INTERNAL MEDICINE

## 2022-02-11 ENCOUNTER — LAB ENCOUNTER (OUTPATIENT)
Dept: LAB | Age: 37
End: 2022-02-11
Attending: INTERNAL MEDICINE
Payer: COMMERCIAL

## 2022-02-11 ENCOUNTER — TELEPHONE (OUTPATIENT)
Dept: INTERNAL MEDICINE CLINIC | Facility: CLINIC | Age: 37
End: 2022-02-11

## 2022-02-11 DIAGNOSIS — Z13.0 SCREENING FOR IRON DEFICIENCY ANEMIA: ICD-10-CM

## 2022-02-11 DIAGNOSIS — Z13.29 SCREENING FOR THYROID DISORDER: ICD-10-CM

## 2022-02-11 DIAGNOSIS — Z00.00 ROUTINE GENERAL MEDICAL EXAMINATION AT A HEALTH CARE FACILITY: ICD-10-CM

## 2022-02-11 DIAGNOSIS — Z13.220 SCREENING FOR LIPOID DISORDERS: ICD-10-CM

## 2022-02-11 LAB
ALBUMIN SERPL-MCNC: 4.5 G/DL (ref 3.4–5)
ALBUMIN/GLOB SERPL: 1.7 {RATIO} (ref 1–2)
ALP LIVER SERPL-CCNC: 67 U/L
ALT SERPL-CCNC: 48 U/L
ANION GAP SERPL CALC-SCNC: 5 MMOL/L (ref 0–18)
AST SERPL-CCNC: 26 U/L (ref 15–37)
BASOPHILS NFR BLD AUTO: 0.6 %
BILIRUB SERPL-MCNC: 0.7 MG/DL (ref 0.1–2)
BUN BLD-MCNC: 13 MG/DL (ref 7–18)
BUN/CREAT SERPL: 14.8 (ref 10–20)
CALCIUM BLD-MCNC: 9.7 MG/DL (ref 8.5–10.1)
CHLORIDE SERPL-SCNC: 103 MMOL/L (ref 98–112)
CHOLEST SERPL-MCNC: 195 MG/DL (ref ?–200)
CO2 SERPL-SCNC: 31 MMOL/L (ref 21–32)
DEPRECATED RDW RBC AUTO: 41.7 FL (ref 35.1–46.3)
EOSINOPHIL # BLD AUTO: 0.12 X10(3) UL (ref 0–0.7)
EOSINOPHIL NFR BLD AUTO: 2.2 %
ERYTHROCYTE [DISTWIDTH] IN BLOOD BY AUTOMATED COUNT: 12.3 % (ref 11–15)
FASTING PATIENT LIPID ANSWER: YES
FASTING STATUS PATIENT QL REPORTED: YES
GLUCOSE BLD-MCNC: 90 MG/DL (ref 70–99)
HCT VFR BLD AUTO: 47.3 %
HDLC SERPL-MCNC: 46 MG/DL (ref 40–59)
HGB BLD-MCNC: 16.1 G/DL
IMM GRANULOCYTES # BLD AUTO: 0.02 X10(3) UL (ref 0–1)
IMM GRANULOCYTES NFR BLD: 0.4 %
LDLC SERPL CALC-MCNC: 121 MG/DL (ref ?–100)
LYMPHOCYTES # BLD AUTO: 1.57 X10(3) UL (ref 1–4)
LYMPHOCYTES NFR BLD AUTO: 29.2 %
MCH RBC QN AUTO: 31.2 PG (ref 26–34)
MCHC RBC AUTO-ENTMCNC: 34 G/DL (ref 31–37)
MCV RBC AUTO: 91.7 FL
MONOCYTES # BLD AUTO: 0.56 X10(3) UL (ref 0.1–1)
MONOCYTES NFR BLD AUTO: 10.4 %
NEUTROPHILS # BLD AUTO: 3.07 X10 (3) UL (ref 1.5–7.7)
NEUTROPHILS # BLD AUTO: 3.07 X10(3) UL (ref 1.5–7.7)
NEUTROPHILS NFR BLD AUTO: 57.2 %
NONHDLC SERPL-MCNC: 149 MG/DL (ref ?–130)
OSMOLALITY SERPL CALC.SUM OF ELEC: 288 MOSM/KG (ref 275–295)
PLATELET # BLD AUTO: 245 10(3)UL (ref 150–450)
POTASSIUM SERPL-SCNC: 4.3 MMOL/L (ref 3.5–5.1)
PROT SERPL-MCNC: 7.1 G/DL (ref 6.4–8.2)
RBC # BLD AUTO: 5.16 X10(6)UL
SODIUM SERPL-SCNC: 139 MMOL/L (ref 136–145)
TRIGL SERPL-MCNC: 160 MG/DL (ref 30–149)
TSI SER-ACNC: 1.59 MIU/ML (ref 0.36–3.74)
VLDLC SERPL CALC-MCNC: 28 MG/DL (ref 0–30)
WBC # BLD AUTO: 5.4 X10(3) UL (ref 4–11)

## 2022-02-11 PROCEDURE — 36415 COLL VENOUS BLD VENIPUNCTURE: CPT

## 2022-02-11 PROCEDURE — 80061 LIPID PANEL: CPT

## 2022-02-11 PROCEDURE — 85025 COMPLETE CBC W/AUTO DIFF WBC: CPT

## 2022-02-11 PROCEDURE — 80053 COMPREHEN METABOLIC PANEL: CPT

## 2022-02-11 PROCEDURE — 84443 ASSAY THYROID STIM HORMONE: CPT

## 2022-02-11 NOTE — TELEPHONE ENCOUNTER
Please notify patient reviewed his blood work from today. Cholesterol triglycerides were slightly elevated, no medications that we need to start at this time. We will focus on nutrition and exercise as he has been doing. All other blood tests are in the normal range.   No other new recommendations

## 2022-03-26 ENCOUNTER — OFFICE VISIT (OUTPATIENT)
Dept: FAMILY MEDICINE CLINIC | Facility: CLINIC | Age: 37
End: 2022-03-26
Payer: COMMERCIAL

## 2022-03-26 VITALS
DIASTOLIC BLOOD PRESSURE: 68 MMHG | BODY MASS INDEX: 30 KG/M2 | OXYGEN SATURATION: 96 % | SYSTOLIC BLOOD PRESSURE: 104 MMHG | RESPIRATION RATE: 16 BRPM | TEMPERATURE: 99 F | HEART RATE: 80 BPM | WEIGHT: 259 LBS

## 2022-03-26 DIAGNOSIS — J02.9 SORE THROAT: ICD-10-CM

## 2022-03-26 DIAGNOSIS — J02.0 STREP PHARYNGITIS: Primary | ICD-10-CM

## 2022-03-26 DIAGNOSIS — Z11.52 ENCOUNTER FOR SCREENING FOR COVID-19: ICD-10-CM

## 2022-03-26 LAB
CONTROL LINE PRESENT WITH A CLEAR BACKGROUND (YES/NO): YES YES/NO
KIT LOT #: ABNORMAL NUMERIC
STREP GRP A CUL-SCR: POSITIVE

## 2022-03-26 PROCEDURE — 99213 OFFICE O/P EST LOW 20 MIN: CPT | Performed by: PHYSICIAN ASSISTANT

## 2022-03-26 PROCEDURE — 87880 STREP A ASSAY W/OPTIC: CPT | Performed by: PHYSICIAN ASSISTANT

## 2022-03-26 PROCEDURE — 3078F DIAST BP <80 MM HG: CPT | Performed by: PHYSICIAN ASSISTANT

## 2022-03-26 PROCEDURE — 3074F SYST BP LT 130 MM HG: CPT | Performed by: PHYSICIAN ASSISTANT

## 2022-03-26 RX ORDER — AMOXICILLIN 500 MG/1
500 CAPSULE ORAL 2 TIMES DAILY
Qty: 20 CAPSULE | Refills: 0 | Status: SHIPPED | OUTPATIENT
Start: 2022-03-26 | End: 2022-04-05

## 2022-03-27 LAB — SARS-COV-2 RNA RESP QL NAA+PROBE: NOT DETECTED

## 2022-04-15 ENCOUNTER — OFFICE VISIT (OUTPATIENT)
Dept: FAMILY MEDICINE CLINIC | Facility: CLINIC | Age: 37
End: 2022-04-15
Payer: COMMERCIAL

## 2022-04-15 VITALS
SYSTOLIC BLOOD PRESSURE: 118 MMHG | TEMPERATURE: 98 F | HEART RATE: 68 BPM | DIASTOLIC BLOOD PRESSURE: 79 MMHG | WEIGHT: 259 LBS | BODY MASS INDEX: 29.97 KG/M2 | HEIGHT: 78 IN | OXYGEN SATURATION: 97 % | RESPIRATION RATE: 18 BRPM

## 2022-04-15 DIAGNOSIS — J06.9 UPPER RESPIRATORY TRACT INFECTION, UNSPECIFIED TYPE: Primary | ICD-10-CM

## 2022-04-15 PROCEDURE — 87081 CULTURE SCREEN ONLY: CPT | Performed by: NURSE PRACTITIONER

## 2022-04-15 PROCEDURE — 3074F SYST BP LT 130 MM HG: CPT | Performed by: NURSE PRACTITIONER

## 2022-04-15 PROCEDURE — 99213 OFFICE O/P EST LOW 20 MIN: CPT | Performed by: NURSE PRACTITIONER

## 2022-04-15 PROCEDURE — 3008F BODY MASS INDEX DOCD: CPT | Performed by: NURSE PRACTITIONER

## 2022-04-15 PROCEDURE — 3078F DIAST BP <80 MM HG: CPT | Performed by: NURSE PRACTITIONER

## 2022-07-19 ENCOUNTER — OFFICE VISIT (OUTPATIENT)
Dept: FAMILY MEDICINE CLINIC | Facility: CLINIC | Age: 37
End: 2022-07-19
Payer: COMMERCIAL

## 2022-07-19 VITALS
RESPIRATION RATE: 16 BRPM | SYSTOLIC BLOOD PRESSURE: 126 MMHG | HEART RATE: 63 BPM | TEMPERATURE: 98 F | DIASTOLIC BLOOD PRESSURE: 72 MMHG | OXYGEN SATURATION: 98 %

## 2022-07-19 DIAGNOSIS — H65.01 NON-RECURRENT ACUTE SEROUS OTITIS MEDIA OF RIGHT EAR: Primary | ICD-10-CM

## 2022-07-19 DIAGNOSIS — J06.9 UPPER RESPIRATORY TRACT INFECTION, UNSPECIFIED TYPE: ICD-10-CM

## 2022-07-19 PROCEDURE — 3074F SYST BP LT 130 MM HG: CPT | Performed by: NURSE PRACTITIONER

## 2022-07-19 PROCEDURE — 99213 OFFICE O/P EST LOW 20 MIN: CPT | Performed by: NURSE PRACTITIONER

## 2022-07-19 PROCEDURE — 3078F DIAST BP <80 MM HG: CPT | Performed by: NURSE PRACTITIONER

## 2022-07-19 RX ORDER — AMOXICILLIN 875 MG/1
875 TABLET, COATED ORAL 2 TIMES DAILY
Qty: 20 TABLET | Refills: 0 | Status: SHIPPED | OUTPATIENT
Start: 2022-07-19 | End: 2022-07-29

## 2022-07-20 LAB — SARS-COV-2 RNA RESP QL NAA+PROBE: NOT DETECTED

## 2022-11-03 ENCOUNTER — IMMUNIZATION (OUTPATIENT)
Dept: LAB | Age: 37
End: 2022-11-03
Attending: EMERGENCY MEDICINE
Payer: COMMERCIAL

## 2022-11-03 DIAGNOSIS — Z23 NEED FOR VACCINATION: Primary | ICD-10-CM

## 2022-11-03 PROCEDURE — 90471 IMMUNIZATION ADMIN: CPT

## 2022-11-03 PROCEDURE — 90686 IIV4 VACC NO PRSV 0.5 ML IM: CPT

## 2022-11-03 PROCEDURE — 0124A SARSCOV2 VAC BVL 30MCG/0.3ML: CPT

## 2022-12-21 ENCOUNTER — HOSPITAL ENCOUNTER (OUTPATIENT)
Age: 37
Discharge: HOME | End: 2022-12-21
Payer: COMMERCIAL

## 2022-12-21 ENCOUNTER — APPOINTMENT (OUTPATIENT)
Dept: GENERAL RADIOLOGY | Age: 37
End: 2022-12-21
Attending: NURSE PRACTITIONER
Payer: COMMERCIAL

## 2022-12-21 VITALS
HEART RATE: 60 BPM | DIASTOLIC BLOOD PRESSURE: 73 MMHG | TEMPERATURE: 98 F | SYSTOLIC BLOOD PRESSURE: 145 MMHG | RESPIRATION RATE: 18 BRPM | OXYGEN SATURATION: 99 %

## 2022-12-21 DIAGNOSIS — M79.671 RIGHT FOOT PAIN: Primary | ICD-10-CM

## 2022-12-21 PROCEDURE — 73630 X-RAY EXAM OF FOOT: CPT | Performed by: NURSE PRACTITIONER

## 2022-12-21 PROCEDURE — 99213 OFFICE O/P EST LOW 20 MIN: CPT | Performed by: NURSE PRACTITIONER

## 2022-12-21 NOTE — DISCHARGE INSTRUCTIONS
No fracture seen on the x-ray. Wear supportive shoe inserts. Ice. Ibuprofen. Roll your foot on a tennis ball.   Follow-up with podiatry if no improvement

## 2022-12-22 ENCOUNTER — OFFICE VISIT (OUTPATIENT)
Dept: DERMATOLOGY CLINIC | Facility: CLINIC | Age: 37
End: 2022-12-22
Payer: COMMERCIAL

## 2022-12-22 DIAGNOSIS — D18.01 CHERRY ANGIOMA: ICD-10-CM

## 2022-12-22 DIAGNOSIS — D22.9 MULTIPLE BENIGN NEVI: ICD-10-CM

## 2022-12-22 DIAGNOSIS — L81.4 LENTIGINES: Primary | ICD-10-CM

## 2023-02-03 ENCOUNTER — OFFICE VISIT (OUTPATIENT)
Dept: PODIATRY CLINIC | Facility: CLINIC | Age: 38
End: 2023-02-03

## 2023-02-03 VITALS — SYSTOLIC BLOOD PRESSURE: 134 MMHG | HEART RATE: 66 BPM | DIASTOLIC BLOOD PRESSURE: 85 MMHG

## 2023-02-03 DIAGNOSIS — M25.871 SESAMOIDITIS OF RIGHT FOOT: Primary | ICD-10-CM

## 2023-02-03 DIAGNOSIS — M79.671 RIGHT FOOT PAIN: ICD-10-CM

## 2023-02-03 PROCEDURE — 3075F SYST BP GE 130 - 139MM HG: CPT | Performed by: STUDENT IN AN ORGANIZED HEALTH CARE EDUCATION/TRAINING PROGRAM

## 2023-02-03 PROCEDURE — 3079F DIAST BP 80-89 MM HG: CPT | Performed by: STUDENT IN AN ORGANIZED HEALTH CARE EDUCATION/TRAINING PROGRAM

## 2023-02-03 PROCEDURE — 20551 NJX 1 TENDON ORIGIN/INSJ: CPT | Performed by: STUDENT IN AN ORGANIZED HEALTH CARE EDUCATION/TRAINING PROGRAM

## 2023-02-03 PROCEDURE — 99203 OFFICE O/P NEW LOW 30 MIN: CPT | Performed by: STUDENT IN AN ORGANIZED HEALTH CARE EDUCATION/TRAINING PROGRAM

## 2023-02-03 RX ORDER — DEXAMETHASONE SODIUM PHOSPHATE 4 MG/ML
4 VIAL (ML) INJECTION ONCE
Status: COMPLETED | OUTPATIENT
Start: 2023-02-03 | End: 2023-02-03

## 2023-02-03 RX ORDER — TRIAMCINOLONE ACETONIDE 40 MG/ML
40 INJECTION, SUSPENSION INTRA-ARTICULAR; INTRAMUSCULAR ONCE
Status: COMPLETED | OUTPATIENT
Start: 2023-02-03 | End: 2023-02-03

## 2023-02-03 RX ADMIN — TRIAMCINOLONE ACETONIDE 40 MG: 40 INJECTION, SUSPENSION INTRA-ARTICULAR; INTRAMUSCULAR at 08:55:00

## 2023-02-03 RX ADMIN — DEXAMETHASONE SODIUM PHOSPHATE 4 MG: 4 MG/ML VIAL (ML) INJECTION at 08:54:00

## 2023-02-03 NOTE — PROGRESS NOTES
Per Dr. Stephen Duke to draw up 1ml of dexamethasone sodium phosphate. 1ml Kenalog 40 and 1ml lidocaine 1% for a right foot injection.

## 2023-04-05 ENCOUNTER — HOSPITAL ENCOUNTER (OUTPATIENT)
Age: 38
Discharge: HOME | End: 2023-04-05
Payer: COMMERCIAL

## 2023-04-05 VITALS
HEART RATE: 88 BPM | RESPIRATION RATE: 20 BRPM | TEMPERATURE: 98 F | OXYGEN SATURATION: 98 % | SYSTOLIC BLOOD PRESSURE: 135 MMHG | DIASTOLIC BLOOD PRESSURE: 94 MMHG

## 2023-04-05 DIAGNOSIS — J02.0 STREP PHARYNGITIS: Primary | ICD-10-CM

## 2023-04-05 LAB — S PYO AG THROAT QL: POSITIVE

## 2023-04-05 PROCEDURE — 87880 STREP A ASSAY W/OPTIC: CPT | Performed by: NURSE PRACTITIONER

## 2023-04-05 PROCEDURE — 99213 OFFICE O/P EST LOW 20 MIN: CPT | Performed by: NURSE PRACTITIONER

## 2023-04-05 RX ORDER — AMOXICILLIN 500 MG/1
500 TABLET, FILM COATED ORAL 2 TIMES DAILY
Qty: 20 TABLET | Refills: 0 | Status: SHIPPED | OUTPATIENT
Start: 2023-04-05 | End: 2023-04-15

## 2023-04-05 NOTE — ED INITIAL ASSESSMENT (HPI)
Pt c/o sore throat which started last night. Daughter is strep positive. Pt can see white polyps in back of throat.

## 2023-05-08 ENCOUNTER — OFFICE VISIT (OUTPATIENT)
Dept: FAMILY MEDICINE CLINIC | Facility: CLINIC | Age: 38
End: 2023-05-08
Payer: COMMERCIAL

## 2023-05-08 VITALS
WEIGHT: 270 LBS | SYSTOLIC BLOOD PRESSURE: 125 MMHG | RESPIRATION RATE: 18 BRPM | BODY MASS INDEX: 31.24 KG/M2 | OXYGEN SATURATION: 97 % | TEMPERATURE: 98 F | HEIGHT: 78 IN | DIASTOLIC BLOOD PRESSURE: 77 MMHG | HEART RATE: 75 BPM

## 2023-05-08 DIAGNOSIS — J02.0 STREP PHARYNGITIS: Primary | ICD-10-CM

## 2023-05-08 DIAGNOSIS — J02.9 SORE THROAT: ICD-10-CM

## 2023-05-08 LAB
CONTROL LINE PRESENT WITH A CLEAR BACKGROUND (YES/NO): YES YES/NO
STREP GRP A CUL-SCR: POSITIVE

## 2023-05-08 RX ORDER — AMOXICILLIN AND CLAVULANATE POTASSIUM 875; 125 MG/1; MG/1
1 TABLET, FILM COATED ORAL 2 TIMES DAILY
Qty: 20 TABLET | Refills: 0 | Status: SHIPPED | OUTPATIENT
Start: 2023-05-08 | End: 2023-05-18

## 2023-06-12 ENCOUNTER — TELEPHONE (OUTPATIENT)
Dept: PODIATRY CLINIC | Facility: CLINIC | Age: 38
End: 2023-06-12

## 2023-06-12 NOTE — TELEPHONE ENCOUNTER
S/w patient- States that he received cortisone injection in Februrary with Dr Zak Ventura. Patient states that he has had increase in pain in his foot since Saturday. Patient states that cortisone helped, but he is back in pain and wondering if he could come in for another shot. Opening was available for 6/21/23 at 8:45 In [place].  Patient accepted appointment

## 2023-06-12 NOTE — TELEPHONE ENCOUNTER
Pt has bad foot pain , trouble walking now - had cortisone shot in February - looking to get another shot or appt

## 2023-06-21 ENCOUNTER — OFFICE VISIT (OUTPATIENT)
Dept: PODIATRY CLINIC | Facility: CLINIC | Age: 38
End: 2023-06-21

## 2023-06-21 DIAGNOSIS — M79.671 RIGHT FOOT PAIN: ICD-10-CM

## 2023-06-21 DIAGNOSIS — M25.871 SESAMOIDITIS OF RIGHT FOOT: Primary | ICD-10-CM

## 2023-06-21 PROCEDURE — L4387 NON-PNEUM WALK BOOT PRE OTS: HCPCS | Performed by: PODIATRIST

## 2023-06-21 PROCEDURE — 99214 OFFICE O/P EST MOD 30 MIN: CPT | Performed by: PODIATRIST

## 2023-07-13 ENCOUNTER — HOSPITAL ENCOUNTER (OUTPATIENT)
Dept: MRI IMAGING | Age: 38
Discharge: HOME | End: 2023-07-13
Attending: PODIATRIST
Payer: COMMERCIAL

## 2023-07-13 DIAGNOSIS — M79.671 RIGHT FOOT PAIN: ICD-10-CM

## 2023-07-13 DIAGNOSIS — M25.871 SESAMOIDITIS OF RIGHT FOOT: ICD-10-CM

## 2023-07-13 PROCEDURE — 73718 MRI LOWER EXTREMITY W/O DYE: CPT | Performed by: PODIATRIST

## 2023-07-24 ENCOUNTER — OFFICE VISIT (OUTPATIENT)
Dept: PODIATRY CLINIC | Facility: CLINIC | Age: 38
End: 2023-07-24

## 2023-07-24 DIAGNOSIS — M25.871 SESAMOIDITIS OF RIGHT FOOT: Primary | ICD-10-CM

## 2023-07-24 DIAGNOSIS — M79.671 RIGHT FOOT PAIN: ICD-10-CM

## 2023-07-24 PROCEDURE — 99213 OFFICE O/P EST LOW 20 MIN: CPT | Performed by: PODIATRIST

## 2023-07-24 NOTE — PROGRESS NOTES
[facility], [facility] Podiatry  Progress Note    Johnathon Pearce is a 45year old male. Patient presents with: Foot Pain: Right- Patient here to discuss MRI results of right foot. Denies pain right now. Patient has been wearing the short CAM boot with ambulation. HPI:     This is a pleasant male that presents to clinic today due to right foot pain f/u. He states the pain started in December of 2022. He was diagnosed with sesamoiditis by Dr. Teresa Roth. He was given a steroid injection which helped greatly. He is here to go over MRI results. He notes great improvement with the cam boot. Allergies: Patient has no known allergies. No current outpatient medications on file. 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 ([facility] [address]     History of splenomegaly       Past Surgical History:   Procedure Laterality Date    OTHER      cyst removal    OTHER SURGICAL HISTORY  2007    benign tumor in abdomen      Family History   Problem Relation Age of Onset    Diabetes Father     Other (Other) Father         MS    Diabetes Maternal Grandfather       Social History    Socioeconomic History      Marital status:     Tobacco Use      Smoking status: Never      Smokeless tobacco: Never    Vaping Use      Vaping Use: Never used    Substance and Sexual Activity      Alcohol use: Yes        Alcohol/week: 0.0 standard drinks of alcohol        Comment: social      Drug use: No    Other Topics      Concerns:        Reaction to local anesthetic: No        Pt has a pacemaker: No        Pt has a defibrillator: No          REVIEW OF SYSTEMS:   Denies nausea, fever, chills  No calf pain  No other muscle or joint aches  Denies chest pain or shortness of breath. EXAM:   There were no vitals taken for this visit. Constitutional:   Patient in no apparent distress. Well kept Of normal body habitus. Alert and oriented to person, place, and time.   Integumentary examination:   There are no varicosities. Skin appears moist, warm, and supple with positive hair growth. There are no color changes. No open lesions. No macerations, No Hyperkeratotic lesions. Nails are of normal thickness and appearance. Vascular examination:   DP pulse is 2/4  PT pulse is 2/4  Capillary refill is immediate  Edema is not present bilateral.  Temperature warm proximally to warm distally bilateral.  Neurological examination:   Vibratory (128-Hz tuning fork) sensation is present to right and is present to left. Sharp/dull is present to right and is present to left. Musculoskeletal examination:  Muscle Strength is 5/5. Adequate right hallus DF at the MPJ with mild pain at end ROM    Severe POP to right tibial sesamoid, improved      LABS & IMAGING:     Lab Results   Component Value Date    GLU 90 02/11/2022    BUN 13 02/11/2022    CREATSERUM 0.88 02/11/2022    BUNCREA 14.8 02/11/2022    ANIONGAP 5 02/11/2022    GFRAA 128 02/11/2022    GFRNAA 111 02/11/2022    CA 9.7 02/11/2022     02/11/2022    K 4.3 02/11/2022     02/11/2022    CO2 31.0 02/11/2022    OSMOCALC 288 02/11/2022        No results found for: EAG, A1C     No results found. ASSESSMENT AND PLAN:   Diagnoses and all orders for this visit:    Sesamoiditis of right foot    Right foot pain          Plan:     Evaluated the patient and discussed treatment options. Reviewed the patients x-rays with the patient. Discussed the importance of immobilization. Recommend cryotherapy/icing, rest, and elevation. Xray Right foot: 12/21/22  CONCLUSION:   1. No dislocation or significant arthropathy. Bipartite tibial sesamoid vs possible sesamoid fracture. MRI Right foot: 7/13/23  CONCLUSION:   1. Sesamoiditis with diffuse edema in the bipartite medial hallux sesamoid.        Explained to pt that his right tibial sesamoid pain is due to sesamoiditis     Pt may transition out of the cam boot and into supportive tennis shoes but if pain resurfaces pt to go back in to the cam boot. Will hold off on 2nd steroid injection right foot due to improvement. Pt will check orthotic coverage    RTC 1 month for possible orthotic casting with right sub 1st met head accomodation      No follow-ups on file.     Maurizio Meyers, TASIA  7/24/23

## 2023-08-29 ENCOUNTER — OFFICE VISIT (OUTPATIENT)
Dept: INTERNAL MEDICINE CLINIC | Facility: CLINIC | Age: 38
End: 2023-08-29

## 2023-08-29 VITALS
BODY MASS INDEX: 31.84 KG/M2 | TEMPERATURE: 98 F | HEIGHT: 77.5 IN | WEIGHT: 272.38 LBS | SYSTOLIC BLOOD PRESSURE: 120 MMHG | HEART RATE: 69 BPM | DIASTOLIC BLOOD PRESSURE: 78 MMHG | OXYGEN SATURATION: 97 %

## 2023-08-29 DIAGNOSIS — Z00.00 ANNUAL PHYSICAL EXAM: Primary | ICD-10-CM

## 2023-08-29 DIAGNOSIS — Z13.220 SCREENING FOR LIPOID DISORDERS: ICD-10-CM

## 2023-08-29 DIAGNOSIS — Z13.0 SCREENING FOR DEFICIENCY ANEMIA: ICD-10-CM

## 2023-08-29 DIAGNOSIS — Z13.29 SCREENING FOR THYROID DISORDER: ICD-10-CM

## 2023-08-29 DIAGNOSIS — R07.9 CHEST PAIN OF UNCERTAIN ETIOLOGY: ICD-10-CM

## 2023-08-29 DIAGNOSIS — Z14.8 HEMOCHROMATOSIS CARRIER: ICD-10-CM

## 2023-08-29 DIAGNOSIS — R16.1 SPLENOMEGALY: ICD-10-CM

## 2023-08-29 DIAGNOSIS — Z13.1 SCREENING FOR DIABETES MELLITUS: ICD-10-CM

## 2023-08-29 PROCEDURE — 3074F SYST BP LT 130 MM HG: CPT | Performed by: INTERNAL MEDICINE

## 2023-08-29 PROCEDURE — 3078F DIAST BP <80 MM HG: CPT | Performed by: INTERNAL MEDICINE

## 2023-08-29 PROCEDURE — 3008F BODY MASS INDEX DOCD: CPT | Performed by: INTERNAL MEDICINE

## 2023-08-29 PROCEDURE — 99395 PREV VISIT EST AGE 18-39: CPT | Performed by: INTERNAL MEDICINE

## 2023-10-13 ENCOUNTER — LAB ENCOUNTER (OUTPATIENT)
Dept: LAB | Age: 38
End: 2023-10-13
Attending: INTERNAL MEDICINE
Payer: COMMERCIAL

## 2023-10-13 DIAGNOSIS — Z13.0 SCREENING FOR DEFICIENCY ANEMIA: ICD-10-CM

## 2023-10-13 DIAGNOSIS — Z13.220 SCREENING FOR LIPOID DISORDERS: ICD-10-CM

## 2023-10-13 DIAGNOSIS — Z13.29 SCREENING FOR THYROID DISORDER: ICD-10-CM

## 2023-10-13 DIAGNOSIS — Z00.00 ANNUAL PHYSICAL EXAM: ICD-10-CM

## 2023-10-13 LAB
ALBUMIN SERPL-MCNC: 3.9 G/DL (ref 3.4–5)
ALBUMIN/GLOB SERPL: 1.1 {RATIO} (ref 1–2)
ALP LIVER SERPL-CCNC: 73 U/L
ALT SERPL-CCNC: 89 U/L
ANION GAP SERPL CALC-SCNC: 4 MMOL/L (ref 0–18)
AST SERPL-CCNC: 31 U/L (ref 15–37)
BASOPHILS # BLD AUTO: 0.03 X10(3) UL (ref 0–0.2)
BASOPHILS NFR BLD AUTO: 0.5 %
BILIRUB SERPL-MCNC: 0.7 MG/DL (ref 0.1–2)
BUN BLD-MCNC: 13 MG/DL (ref 7–18)
BUN/CREAT SERPL: 12.9 (ref 10–20)
CALCIUM BLD-MCNC: 9 MG/DL (ref 8.5–10.1)
CHLORIDE SERPL-SCNC: 107 MMOL/L (ref 98–112)
CHOLEST SERPL-MCNC: 198 MG/DL (ref ?–200)
CO2 SERPL-SCNC: 28 MMOL/L (ref 21–32)
CREAT BLD-MCNC: 1.01 MG/DL
DEPRECATED RDW RBC AUTO: 41.3 FL (ref 35.1–46.3)
EGFRCR SERPLBLD CKD-EPI 2021: 98 ML/MIN/1.73M2 (ref 60–?)
EOSINOPHIL # BLD AUTO: 0.12 X10(3) UL (ref 0–0.7)
EOSINOPHIL NFR BLD AUTO: 1.9 %
ERYTHROCYTE [DISTWIDTH] IN BLOOD BY AUTOMATED COUNT: 12.5 % (ref 11–15)
FASTING PATIENT LIPID ANSWER: YES
FASTING STATUS PATIENT QL REPORTED: YES
GLOBULIN PLAS-MCNC: 3.5 G/DL (ref 2.8–4.4)
GLUCOSE BLD-MCNC: 111 MG/DL (ref 70–99)
HCT VFR BLD AUTO: 46 %
HDLC SERPL-MCNC: 43 MG/DL (ref 40–59)
HGB BLD-MCNC: 15.4 G/DL
IMM GRANULOCYTES # BLD AUTO: 0.02 X10(3) UL (ref 0–1)
IMM GRANULOCYTES NFR BLD: 0.3 %
LDLC SERPL CALC-MCNC: 124 MG/DL (ref ?–100)
LYMPHOCYTES # BLD AUTO: 1.47 X10(3) UL (ref 1–4)
LYMPHOCYTES NFR BLD AUTO: 23.8 %
MCH RBC QN AUTO: 30.1 PG (ref 26–34)
MCHC RBC AUTO-ENTMCNC: 33.5 G/DL (ref 31–37)
MCV RBC AUTO: 90 FL
MONOCYTES # BLD AUTO: 0.59 X10(3) UL (ref 0.1–1)
MONOCYTES NFR BLD AUTO: 9.6 %
NEUTROPHILS # BLD AUTO: 3.94 X10 (3) UL (ref 1.5–7.7)
NEUTROPHILS # BLD AUTO: 3.94 X10(3) UL (ref 1.5–7.7)
NEUTROPHILS NFR BLD AUTO: 63.9 %
NONHDLC SERPL-MCNC: 155 MG/DL (ref ?–130)
OSMOLALITY SERPL CALC.SUM OF ELEC: 289 MOSM/KG (ref 275–295)
PLATELET # BLD AUTO: 270 10(3)UL (ref 150–450)
POTASSIUM SERPL-SCNC: 4.1 MMOL/L (ref 3.5–5.1)
PROT SERPL-MCNC: 7.4 G/DL (ref 6.4–8.2)
RBC # BLD AUTO: 5.11 X10(6)UL
SODIUM SERPL-SCNC: 139 MMOL/L (ref 136–145)
TRIGL SERPL-MCNC: 175 MG/DL (ref 30–149)
TSI SER-ACNC: 1.38 MIU/ML (ref 0.36–3.74)
VLDLC SERPL CALC-MCNC: 31 MG/DL (ref 0–30)
WBC # BLD AUTO: 6.2 X10(3) UL (ref 4–11)

## 2023-10-13 PROCEDURE — 80061 LIPID PANEL: CPT

## 2023-10-13 PROCEDURE — 80053 COMPREHEN METABOLIC PANEL: CPT

## 2023-10-13 PROCEDURE — 36415 COLL VENOUS BLD VENIPUNCTURE: CPT

## 2023-10-13 PROCEDURE — 85025 COMPLETE CBC W/AUTO DIFF WBC: CPT

## 2023-10-13 PROCEDURE — 84443 ASSAY THYROID STIM HORMONE: CPT

## 2023-10-16 ENCOUNTER — TELEPHONE (OUTPATIENT)
Dept: INTERNAL MEDICINE CLINIC | Facility: CLINIC | Age: 38
End: 2023-10-16

## 2023-10-16 DIAGNOSIS — R79.89 ELEVATED LFTS: Primary | ICD-10-CM

## 2023-10-16 NOTE — TELEPHONE ENCOUNTER
Please notify patient that I reviewed the blood work from 10/13    Labs  The sugar level and cholesterol levels are borderline high, we should focus on optimizing nutrition continue to exercise as able  1 the liver test came back elevated as well slightly, possibly from dehydration that day    Would recommend a nonfasting set of liver tests in about 4 weeks to ensure that this normalizes over time.   Keep up with good water intake otherwise

## 2023-11-13 ENCOUNTER — LAB ENCOUNTER (OUTPATIENT)
Dept: LAB | Age: 38
End: 2023-11-13
Attending: INTERNAL MEDICINE
Payer: COMMERCIAL

## 2023-11-13 DIAGNOSIS — R79.89 ELEVATED LFTS: ICD-10-CM

## 2023-11-13 LAB
ALBUMIN SERPL-MCNC: 4.3 G/DL (ref 3.2–4.8)
ALBUMIN/GLOB SERPL: 1.7 {RATIO} (ref 1–2)
ALP LIVER SERPL-CCNC: 78 U/L
ALT SERPL-CCNC: 54 U/L
ANION GAP SERPL CALC-SCNC: 8 MMOL/L (ref 0–18)
AST SERPL-CCNC: 34 U/L (ref ?–34)
BILIRUB SERPL-MCNC: 0.5 MG/DL (ref 0.3–1.2)
BUN BLD-MCNC: 15 MG/DL (ref 9–23)
BUN/CREAT SERPL: 15.2 (ref 10–20)
CALCIUM BLD-MCNC: 9.4 MG/DL (ref 8.7–10.4)
CHLORIDE SERPL-SCNC: 107 MMOL/L (ref 98–112)
CO2 SERPL-SCNC: 27 MMOL/L (ref 21–32)
CREAT BLD-MCNC: 0.99 MG/DL
EGFRCR SERPLBLD CKD-EPI 2021: 100 ML/MIN/1.73M2 (ref 60–?)
FASTING STATUS PATIENT QL REPORTED: YES
GLOBULIN PLAS-MCNC: 2.6 G/DL (ref 2.8–4.4)
GLUCOSE BLD-MCNC: 104 MG/DL (ref 70–99)
OSMOLALITY SERPL CALC.SUM OF ELEC: 295 MOSM/KG (ref 275–295)
POTASSIUM SERPL-SCNC: 4.4 MMOL/L (ref 3.5–5.1)
PROT SERPL-MCNC: 6.9 G/DL (ref 5.7–8.2)
SODIUM SERPL-SCNC: 142 MMOL/L (ref 136–145)

## 2023-11-13 PROCEDURE — 36415 COLL VENOUS BLD VENIPUNCTURE: CPT

## 2023-11-13 PROCEDURE — 80053 COMPREHEN METABOLIC PANEL: CPT

## 2023-11-15 ENCOUNTER — TELEPHONE (OUTPATIENT)
Dept: INTERNAL MEDICINE CLINIC | Facility: CLINIC | Age: 38
End: 2023-11-15

## 2023-11-15 NOTE — TELEPHONE ENCOUNTER
Please notify patient that the glucose was borderline high at 104, very close to goal of less than 100. His liver tests have also improved from October. Should try to maintain adequate water intake, but no other medications were needed at this time.

## 2023-12-01 ENCOUNTER — OFFICE VISIT (OUTPATIENT)
Dept: FAMILY MEDICINE CLINIC | Facility: CLINIC | Age: 38
End: 2023-12-01
Payer: COMMERCIAL

## 2023-12-01 VITALS
SYSTOLIC BLOOD PRESSURE: 108 MMHG | BODY MASS INDEX: 32.37 KG/M2 | RESPIRATION RATE: 16 BRPM | HEART RATE: 62 BPM | WEIGHT: 277 LBS | DIASTOLIC BLOOD PRESSURE: 70 MMHG | OXYGEN SATURATION: 97 % | HEIGHT: 77.5 IN | TEMPERATURE: 98 F

## 2023-12-01 DIAGNOSIS — J06.9 UPPER RESPIRATORY INFECTION, ACUTE: Primary | ICD-10-CM

## 2023-12-01 DIAGNOSIS — H65.02 NON-RECURRENT ACUTE SEROUS OTITIS MEDIA OF LEFT EAR: ICD-10-CM

## 2023-12-01 PROCEDURE — 99213 OFFICE O/P EST LOW 20 MIN: CPT | Performed by: PHYSICIAN ASSISTANT

## 2023-12-01 PROCEDURE — 3078F DIAST BP <80 MM HG: CPT | Performed by: PHYSICIAN ASSISTANT

## 2023-12-01 PROCEDURE — 3008F BODY MASS INDEX DOCD: CPT | Performed by: PHYSICIAN ASSISTANT

## 2023-12-01 PROCEDURE — 3074F SYST BP LT 130 MM HG: CPT | Performed by: PHYSICIAN ASSISTANT

## 2023-12-01 RX ORDER — AMOXICILLIN 875 MG/1
875 TABLET, COATED ORAL 2 TIMES DAILY
Qty: 20 TABLET | Refills: 0 | Status: SHIPPED | OUTPATIENT
Start: 2023-12-01 | End: 2023-12-11

## 2023-12-10 ENCOUNTER — IMMUNIZATION (OUTPATIENT)
Dept: LAB | Age: 38
End: 2023-12-10
Attending: EMERGENCY MEDICINE
Payer: COMMERCIAL

## 2023-12-10 DIAGNOSIS — Z23 NEED FOR VACCINATION: Primary | ICD-10-CM

## 2023-12-10 PROCEDURE — 90480 ADMN SARSCOV2 VAC 1/ONLY CMP: CPT

## 2023-12-10 PROCEDURE — 90471 IMMUNIZATION ADMIN: CPT

## 2023-12-10 PROCEDURE — 90686 IIV4 VACC NO PRSV 0.5 ML IM: CPT

## 2024-02-16 ENCOUNTER — OFFICE VISIT (OUTPATIENT)
Dept: FAMILY MEDICINE CLINIC | Facility: CLINIC | Age: 39
End: 2024-02-16
Payer: COMMERCIAL

## 2024-02-16 VITALS
WEIGHT: 273 LBS | HEART RATE: 92 BPM | RESPIRATION RATE: 16 BRPM | TEMPERATURE: 100 F | OXYGEN SATURATION: 98 % | HEIGHT: 77.5 IN | DIASTOLIC BLOOD PRESSURE: 84 MMHG | BODY MASS INDEX: 31.91 KG/M2 | SYSTOLIC BLOOD PRESSURE: 142 MMHG

## 2024-02-16 DIAGNOSIS — U07.1 COVID-19: ICD-10-CM

## 2024-02-16 DIAGNOSIS — J02.0 STREP PHARYNGITIS: Primary | ICD-10-CM

## 2024-02-16 DIAGNOSIS — Z20.828 EXPOSURE TO INFLUENZA: ICD-10-CM

## 2024-02-16 LAB
CONTROL LINE PRESENT WITH A CLEAR BACKGROUND (YES/NO): YES YES/NO
KIT LOT #: NORMAL NUMERIC
OPERATOR ID: ABNORMAL
RAPID SARS-COV-2 BY PCR: DETECTED
STREP GRP A CUL-SCR: POSITIVE

## 2024-02-16 PROCEDURE — 99213 OFFICE O/P EST LOW 20 MIN: CPT | Performed by: NURSE PRACTITIONER

## 2024-02-16 PROCEDURE — U0002 COVID-19 LAB TEST NON-CDC: HCPCS | Performed by: NURSE PRACTITIONER

## 2024-02-16 PROCEDURE — 87880 STREP A ASSAY W/OPTIC: CPT | Performed by: NURSE PRACTITIONER

## 2024-02-16 PROCEDURE — 87637 SARSCOV2&INF A&B&RSV AMP PRB: CPT | Performed by: NURSE PRACTITIONER

## 2024-02-16 RX ORDER — AMOXICILLIN 500 MG/1
500 CAPSULE ORAL 2 TIMES DAILY
Qty: 20 CAPSULE | Refills: 0 | Status: SHIPPED | OUTPATIENT
Start: 2024-02-16 | End: 2024-02-26

## 2024-02-16 RX ORDER — ONDANSETRON 4 MG/1
4 TABLET, FILM COATED ORAL EVERY 8 HOURS PRN
Qty: 6 TABLET | Refills: 0 | Status: SHIPPED | OUTPATIENT
Start: 2024-02-16

## 2024-02-16 RX ORDER — OSELTAMIVIR PHOSPHATE 75 MG/1
75 CAPSULE ORAL 2 TIMES DAILY
Qty: 10 CAPSULE | Refills: 0 | Status: SHIPPED | OUTPATIENT
Start: 2024-02-16 | End: 2024-02-21

## 2024-02-16 NOTE — PROGRESS NOTES
Subjective:   Patient ID: Fortunato Craig is a 38 year old male.    Pt presents to [facility] c/o sore throat, productive cough, swollen glands, body aches, sinus pressure, chills, sweats.  Onset 2 days.  Has not checked temp.  Denies dyspnea, SOB, chest pain, GI complaints, or rashes.  +Exposure to influenza B and recent travel to [place].  The family he was staying with, the child had flu.  Hasn't taken covid test.  Taking Advil.  Denies chronic medical issues or Hx of asthma/pneumonia.  Reports he had an inflammation of the lining of his heart following covid vaccine and had seen cardiologist in the past.  Tolerating PO.        History/Other:   Review of Systems   Constitutional:  Positive for chills and fatigue. Negative for fever.   HENT:  Positive for congestion, rhinorrhea and sinus pressure. Negative for ear pain.    Respiratory:  Positive for cough. Negative for shortness of breath and wheezing.    Cardiovascular:  Negative for chest pain.   Musculoskeletal:  Positive for myalgias.   Skin:  Negative for rash.     Current Outpatient Medications   Medication Sig Dispense Refill    amoxicillin 500 MG Oral Cap Take 1 capsule (500 mg total) by mouth 2 (two) times daily for 10 days. 20 capsule 0    oseltamivir 75 MG Oral Cap Take 1 capsule (75 mg total) by mouth 2 (two) times daily for 5 days. 10 capsule 0    ondansetron (ZOFRAN) 4 mg tablet Take 1 tablet (4 mg total) by mouth every 8 (eight) hours as needed for Nausea. 6 tablet 0     Allergies:No Known Allergies    /84   Pulse 92   Temp 99.8 °F (37.7 °C)   Resp 16   Ht 6' 5.5\" (1.969 m)   Wt 273 lb (123.8 kg)   SpO2 98%   BMI 31.96 kg/m²       Objective:   Physical Exam  Vitals reviewed.   Constitutional:       Appearance: Normal appearance. He is well-developed. He is ill-appearing (mildly).   HENT:      Head: Normocephalic and atraumatic.      Right Ear: Tympanic membrane, ear canal and external ear normal.      Left Ear: Tympanic membrane, ear canal and  external ear normal.      Nose: Congestion and rhinorrhea present.      Mouth/Throat:      Mouth: Mucous membranes are moist.      Pharynx: Posterior oropharyngeal erythema present. No oropharyngeal exudate.   Eyes:      Extraocular Movements: Extraocular movements intact.      Conjunctiva/sclera: Conjunctivae normal.      Pupils: Pupils are equal, round, and reactive to light.   Cardiovascular:      Rate and Rhythm: Normal rate and regular rhythm.      Pulses: Normal pulses.      Heart sounds: Normal heart sounds. No murmur heard.  Pulmonary:      Effort: Pulmonary effort is normal.      Breath sounds: Normal breath sounds. No stridor. No wheezing or rhonchi.   Musculoskeletal:      Cervical back: Normal range of motion and neck supple. No tenderness.   Skin:     General: Skin is warm and dry.      Findings: No rash.   Neurological:      Mental Status: He is alert.         Assessment & Plan:   1. Strep pharyngitis    2. COVID-19    3. Exposure to influenza        Orders Placed This Encounter   Procedures    Strep A Assay W/Optic    Rapid Covid-19    SARS-CoV-2/Flu A and B/RSV by PCR ([facility]) [E] *Collect in Office!     Results for orders placed or performed in visit on 02/16/24   Strep A Assay W/Optic    Collection Time: 02/16/24  9:02 AM   Result Value Ref Range    Strep Grp A Screen Positive Negative    Control Line Present with a clear background (yes/no) Yes Yes/No    Kit Lot # [id number] Numeric    Kit Expiration Date 04/22/2025 Date   Rapid Covid-19    Collection Time: 02/16/24  9:07 AM    Specimen: Nares   Result Value Ref Range    Rapid SARS-CoV-2 by PCR Detected (A) Not Detected    POCT Lot Number [id number]     POCT Expiration Date 10/07/2025     POCT Procedure Control Control Valid Control Valid     ID [id number]        Meds This Visit:  Requested Prescriptions     Signed Prescriptions Disp Refills    amoxicillin 500 MG Oral Cap 20 capsule 0     Sig: Take 1 capsule (500 mg total) by mouth 2 (two) times  daily for 10 days.    oseltamivir 75 MG Oral Cap 10 capsule 0     Sig: Take 1 capsule (75 mg total) by mouth 2 (two) times daily for 5 days.    ondansetron (ZOFRAN) 4 mg tablet 6 tablet 0     Sig: Take 1 tablet (4 mg total) by mouth every 8 (eight) hours as needed for Nausea.       - Reviewed positive POC test results with pt.  - +Exposure to flu, quad respiratory panel sent.  - If positive, can begin Tamiflu.  Zofran sent if needed for nausea.  - START amoxicillin.  - CDC quarantine guidelines reviewed.  - Strep contagious x24 hours.  - Supportive care discussed.  - Advised follow up visit if no improvement/worsening within 3-5 days.  - Advised to proceed to ER if ever dyspnea, chest pain, SOB, or dehydration.  - Pt verbalizes understanding and is agreeable with plan.

## 2024-02-18 LAB
FLUAV + FLUBV RNA SPEC NAA+PROBE: NOT DETECTED
FLUAV + FLUBV RNA SPEC NAA+PROBE: NOT DETECTED
RSV RNA SPEC NAA+PROBE: NOT DETECTED
SARS-COV-2 RNA RESP QL NAA+PROBE: DETECTED

## 2024-08-10 ENCOUNTER — OFFICE VISIT (OUTPATIENT)
Dept: FAMILY MEDICINE CLINIC | Facility: CLINIC | Age: 39
End: 2024-08-10
Payer: COMMERCIAL

## 2024-08-10 VITALS
OXYGEN SATURATION: 98 % | RESPIRATION RATE: 16 BRPM | TEMPERATURE: 98 F | BODY MASS INDEX: 31.32 KG/M2 | WEIGHT: 268 LBS | SYSTOLIC BLOOD PRESSURE: 122 MMHG | HEART RATE: 63 BPM | HEIGHT: 77.5 IN | DIASTOLIC BLOOD PRESSURE: 84 MMHG

## 2024-08-10 DIAGNOSIS — Z20.822 LAB TEST NEGATIVE FOR COVID-19 VIRUS: ICD-10-CM

## 2024-08-10 DIAGNOSIS — J02.0 STREP PHARYNGITIS: Primary | ICD-10-CM

## 2024-08-10 DIAGNOSIS — R09.81 NASAL CONGESTION: ICD-10-CM

## 2024-08-10 LAB
CONTROL LINE PRESENT WITH A CLEAR BACKGROUND (YES/NO): YES YES/NO
KIT LOT #: NORMAL NUMERIC
OPERATOR ID: NORMAL
POCT LOT NUMBER: NORMAL
RAPID SARS-COV-2 BY PCR: NOT DETECTED
STREP GRP A CUL-SCR: POSITIVE

## 2024-08-10 PROCEDURE — 99214 OFFICE O/P EST MOD 30 MIN: CPT | Performed by: PHYSICIAN ASSISTANT

## 2024-08-10 PROCEDURE — 87880 STREP A ASSAY W/OPTIC: CPT | Performed by: PHYSICIAN ASSISTANT

## 2024-08-10 PROCEDURE — U0002 COVID-19 LAB TEST NON-CDC: HCPCS | Performed by: PHYSICIAN ASSISTANT

## 2024-08-10 RX ORDER — AMOXICILLIN 875 MG/1
875 TABLET, COATED ORAL 2 TIMES DAILY
Qty: 20 TABLET | Refills: 0 | Status: SHIPPED | OUTPATIENT
Start: 2024-08-10 | End: 2024-08-20

## 2024-08-10 NOTE — PATIENT INSTRUCTIONS
You are considered to be contagious until you have been on antibiotics for 24 hours.   You can return to school and/or work once on antibiotics for 24 hours.   Can use over the counter Tylenol/Ibuprofen as needed.  Push fluids- warm or cool liquids, whichever is soothing for patient  Change tooth brush two days into antibiotic therapy.   Warm salt water gargles 2 times per day for at least 3 days.  Do not share utensils or drinks with anyone.  Follow up in 3-5 days if not improving, condition worsens, or fever greater than or equal to 100.4 persists for 72 hours.    Be sure to finish entire course of antibiotics to reduce risk of reinfection or antibiotic resistance

## 2024-08-10 NOTE — PROGRESS NOTES
CHIEF COMPLAINT:     Chief Complaint   Patient presents with    Sore Throat     Sx last night - Fatigue, chills, sweats  Sx this AM - White dots in back of throat, body aches , ST, nasal congestion, slight L ear pressure, sinus pressure, headaches, PND  Denies fever, cough, SOB   No OTC       HPI:   Fortunato Craig is a 39 year old male presents to clinic with symptoms of sore throat. Patient has had since last night. Symptoms have been progressing  since onset.  Patient reports following associated symptoms: white spots on tonsils, body aches, slight ear pressure. Patient reports headache. Patient denies stomach upset. Patient denies rash.  Patient reports history of strep. Patient denies strep pharyngitis exposure.  Treating symptoms with: nothing yet.  Would like COVID testing as well.  Had COVID and strep in Feb 2024      No outpatient medications   Past Medical History:    Hemochromatosis associated with compound heterozygous mutation in HFE gene (HCC)    History of splenomegaly      Social History:  Social History     Socioeconomic History    Marital status:    Tobacco Use    Smoking status: Never    Smokeless tobacco: Never   Vaping Use    Vaping status: Never Used   Substance and Sexual Activity    Alcohol use: Yes     Alcohol/week: 0.0 standard drinks of alcohol     Comment: social    Drug use: No   Other Topics Concern    Reaction to local anesthetic No    Pt has a pacemaker No    Pt has a defibrillator No        REVIEW OF SYSTEMS:   GENERAL HEALTH:  See HPI  SKIN: see HPI  HEENT: denies ear pain, See HPI  RESPIRATORY: denies shortness of breath, or wheezing  CARDIOVASCULAR: denies chest pain, palpitations   GI: denies abdominal pain, constipation and diarrhea  NEURO: denies dizziness or lightheadedness    EXAM:   /84   Pulse 63   Temp 98.2 °F (36.8 °C)   Resp 16   Ht 6' 5.5\" (1.969 m)   Wt 268 lb (121.6 kg)   SpO2 98%   BMI 31.37 kg/m²   GENERAL: well developed, well nourished,in no  apparent distress  SKIN: no rashes,no suspicious lesions  HEAD: atraumatic, normocephalic  EYES: conjunctiva clear, EOM intact  EARS: TM's clear, non-injected, no bulging, retraction, or fluid  NOSE: nostrils patent, no exudates, nasal mucosa pink and noninflamed  THROAT: oral mucosa pink, moist. Posterior pharynx erythematous and injected. Scant white exudates. Tonsils 2/4.  Breath non malodorous. No uvular deviation. No drooling.  NECK: supple  LUNGS: clear to auscultation bilaterally, no wheezes or rhonchi. Breathing is non labored.  CARDIO: RRR without murmur  GI: good BS's,no masses, hepatosplenomegaly, or tenderness on direct palpation  EXTREMITIES: no cyanosis or edema  LYMPH: No anterior cervical. Moderate submandibular lymphadenopathy.  No posterior cervical or occipital lymphadenopathy.    Recent Results (from the past 24 hour(s))   Rapid Strep    Collection Time: 08/10/24 11:00 AM   Result Value Ref Range    Strep Grp A Screen Positive Negative    Control Line Present with a clear background (yes/no) Yes Yes/No    Kit Lot # [id number] Numeric    Kit Expiration Date 05/21/2025 Date   COVID-19 LAB TEST NON-CDC    Collection Time: 08/10/24 11:00 AM    Specimen: Nares   Result Value Ref Range    Rapid SARS-CoV-2 by PCR Not Detected Not Detected    POCT Lot Number [id number]     POCT Expiration Date 12/18/25     POCT Procedure Control Control Valid Control Valid     ,915          ASSESSMENT AND PLAN:   Assessment:   Encounter Diagnoses   Name Primary?    Strep pharyngitis Yes    Nasal congestion     Lab test negative for COVID-19 virus          Plan:  Comfort Measures discussed and listed in Patient Instructions. Prescription: as below.     Requested Prescriptions     Signed Prescriptions Disp Refills    amoxicillin 875 MG Oral Tab 20 tablet 0     Sig: Take 1 tablet (875 mg total) by mouth 2 (two) times daily for 10 days.       Risks, benefits, complications and side effects of meds discussed with  patient.     OTC Tylenol/Motrin prn.   Push fluids- warm or cool liquids, whichever is soothing for patient  If treated with antibiotics, change tooth brush after on medication for 48 hours.   Warm salt water gargles 2 times per day for at least 3 days.    Do not share utensils or drinks with anyone.      Follow up with PCP if not improving, condition worsens, or fever greater than or equal to 100.4 persists for 72 hours.      The patient/parent indicates understanding of these issues and agrees to the plan.  The patient is asked to follow up with their PCP prn.

## 2024-12-23 ENCOUNTER — OFFICE VISIT (OUTPATIENT)
Dept: FAMILY MEDICINE CLINIC | Facility: CLINIC | Age: 39
End: 2024-12-23
Payer: COMMERCIAL

## 2024-12-23 VITALS
DIASTOLIC BLOOD PRESSURE: 66 MMHG | SYSTOLIC BLOOD PRESSURE: 125 MMHG | RESPIRATION RATE: 14 BRPM | OXYGEN SATURATION: 97 % | HEART RATE: 82 BPM | TEMPERATURE: 99 F | HEIGHT: 78 IN | BODY MASS INDEX: 31.7 KG/M2 | WEIGHT: 274 LBS

## 2024-12-23 DIAGNOSIS — J02.9 SORE THROAT: Primary | ICD-10-CM

## 2024-12-23 LAB
CONTROL LINE PRESENT WITH A CLEAR BACKGROUND (YES/NO): YES YES/NO
KIT LOT #: NORMAL NUMERIC

## 2024-12-23 PROCEDURE — 87081 CULTURE SCREEN ONLY: CPT | Performed by: NURSE PRACTITIONER

## 2024-12-23 NOTE — PROGRESS NOTES
CHIEF COMPLAINT:     Chief Complaint   Patient presents with    Sore Throat     Entered by patient         HPI:   Fortunato Craig is a 39 year old male who presents for upper respiratory symptoms for  2 days. Patient reports sore throat, dry cough.  Associated symptoms include  chills, pnd, swollen glands, white spots in back of throat.  Denies fever, n/v/d.  Symptoms have been worsening since onset.  Treating symptoms with advil this afternoon, cough syrup and mucinex.   Has hx of strep throat.    + hx of COVID; No known COVID exposure. No ill eposures  + recent travel - drove out of state to see family.    Other conditions:   BMI: Body mass index is 32.07 kg/m².      No current outpatient medications on file.      Past Medical History:    Hemochromatosis associated with compound heterozygous mutation in HFE gene (HCC)    History of splenomegaly      Past Surgical History:   Procedure Laterality Date    Other      cyst removal    Other surgical history  2007    benign tumor in abdomen         Social History     Socioeconomic History    Marital status:    Tobacco Use    Smoking status: Never    Smokeless tobacco: Never   Vaping Use    Vaping status: Never Used   Substance and Sexual Activity    Alcohol use: Yes     Alcohol/week: 0.0 standard drinks of alcohol     Comment: social    Drug use: No   Other Topics Concern    Reaction to local anesthetic No    Pt has a pacemaker No    Pt has a defibrillator No         REVIEW OF SYSTEMS:   GENERAL: feels well otherwise, adequate appetite  SKIN: no rashes or abnormal skin lesions  HEENT: See HPI  LUNGS: denies shortness of breath or wheezing, See HPI  CARDIOVASCULAR: denies chest pain or palpitations   GI: denies N/V/C or abdominal pain  NEURO: denies dizziness or lightheadedness    EXAM:   /66   Pulse 82   Temp 98.5 °F (36.9 °C) (Tympanic)   Resp 14   Ht 6' 6\" (1.981 m)   Wt 274 lb (124.3 kg)   SpO2 97%   BMI 31.66 kg/m²     Physical Exam  Vitals reviewed.    Constitutional:       General: He is not in acute distress.     Appearance: Normal appearance.   HENT:      Head: Normocephalic and atraumatic.      Right Ear: Tympanic membrane and ear canal normal.      Left Ear: Tympanic membrane and ear canal normal.      Nose: Rhinorrhea present. No congestion. Rhinorrhea is clear.      Right Sinus: No maxillary sinus tenderness or frontal sinus tenderness.      Left Sinus: No maxillary sinus tenderness or frontal sinus tenderness.      Mouth/Throat:      Lips: Pink.      Mouth: Mucous membranes are moist.      Pharynx: Oropharynx is clear. Uvula midline. Posterior oropharyngeal erythema (mild) present. No oropharyngeal exudate.      Tonsils: No tonsillar exudate.   Eyes:      Extraocular Movements: Extraocular movements intact.      Conjunctiva/sclera: Conjunctivae normal.   Cardiovascular:      Rate and Rhythm: Normal rate and regular rhythm.      Heart sounds: Normal heart sounds. No murmur heard.  Pulmonary:      Effort: Pulmonary effort is normal.      Breath sounds: Normal breath sounds and air entry.   Musculoskeletal:      Cervical back: Normal range of motion and neck supple.   Lymphadenopathy:      Cervical: Cervical adenopathy present.      Right cervical: Superficial cervical adenopathy present. No posterior cervical adenopathy.     Left cervical: Superficial cervical adenopathy present. No posterior cervical adenopathy.   Skin:     General: Skin is warm and dry.      Findings: No rash.   Neurological:      Mental Status: He is alert.   Psychiatric:         Speech: Speech normal.         Behavior: Behavior normal. Behavior is cooperative.          Recent Results (from the past 24 hours)   Strep A Assay W/Optic    Collection Time: 12/23/24  5:55 PM   Result Value Ref Range    Strep Grp A Screen Neg Negative    Control Line Present with a clear background (yes/no) yes Yes/No    Kit Lot # [id number] Numeric    Kit Expiration Date 11/4/25 Date       ASSESSMENT AND PLAN:    Fortunato Craig is a 39 year old male who presents with     ASSESSMENT:   Encounter Diagnosis   Name Primary?    Sore throat Yes       PLAN:   Discussed likely start of viral etiology.   Reviewed symptom relief measures with patient.   Monitor for worsening symptoms.  Will send throat culture.  Comfort care as described in Patient Instructions  Medications as below.      Meds & Refills for this Visit:  Requested Prescriptions      No prescriptions requested or ordered in this encounter       Risks, benefits, and side effects of medication explained and discussed.  To f/u with PCP if sx do not resolve as anticipated.  The patient indicates understanding of these issues and agrees to the plan.        Patient Instructions     If we send out a throat culture, we will contact you with the results in 48-72 hours. If positive, then we will call in an appropriate antibiotic. If negative, then the sore throat is most likely viral in origin and should resolve within 7-10 days.     Comfort measures explained and discussed:    OTC Tylenol/ibuprofen as needed.    Push fluids- warm or cool liquids, whichever is soothing for patient.     Avoid caffeine.    Do not share utensils or drinks with anyone.    Good handwashing.    Get plenty of rest.    Can use over the counter benzocaine such as Cepacol throat lozenges or Chloroseptic throat spray to soothe sore throat.    Warm salt water gargles 2-3 times daily for at least 3 days.      If strep test is results are positive:    Take the full course of your antibiotic even if you are feeling better.   You are considered to be contagious until you have been on antibiotics for 24 hours.   You can return to school and/or work once on antibiotics for 24 hours  Change tooth brush two days into therapy  Follow up in 3-5 days if not improving, condition worsens, or fever greater than or equal to 100.4 persists for 72 hours.  Follow up in 3-5 days if not improving, condition worsens, or fever  greater than or equal to 100.4 persists for 72 hours.

## 2025-04-27 NOTE — PATIENT INSTRUCTIONS
- You were seen in clinic for regular annual check-up. We have ordered labs for you and we will call you with the results. Please obtain the bloodwork fasting for 12 hours. OK to drink water the day of your blood draw.    We have the lab downstairs on the first floor.  No appointment is necessary.  Lab hours are Monday-Friday 7:30 AM to 4:45 PM.  There may be Saturday hours 7 AM-11:00 AM as well.     Otherwise you can obtain the blood tests on the weekends at the main hospital or local immediate care/urgent care within the [facility].    We are checking iron levels and inflammatory markers.  Monitor for episodes of the chest pain, there may be some increase sensitivity in the chest wall from the history of pericarditis.  But thankfully this is manageable.  Try to find any other triggering factors in the meantime.    Prior cardiac workup was reassuring however  - Please continue to eat a varied diet including recommended servings of vegetables, fruits, and low fat dairy. Minimize high saturated fats (such as fast foods) and high sugar intake (such as soda)  - We recommend 150 minutes of moderate intensity exercise (brisk walking, swimming) weekly to maintain your current weight.  Targeted weight loss will require more vigorous exercise or more than 150 minutes/week.    Return to clinic in 12 months for follow-up    Overall, keep up the good work with good nutrition and exercise habits.  Lets keep targeting weight loss over time as we have had great success with this over the last few years.

## 2025-04-27 NOTE — H&P
Fortunato Craig is a 39 year old male.    HPI:     Chief Complaint   Patient presents with    Physical     Mr. CRAIG is a 39 year old male coming in for annual physical examination.    7 hours of sleep. Trouble getting to sleep at times.    Work-life balance is good. Travels for work.     Chest discomfort from time to time. Can last for 20-30minutes. 1x a month    I reviewed and updated the PMHx, FamHx, medications, allergies, and SocHx as below with the patient    SocHX  - Home: wife and 2 kids; safe at home  - Work: Works at [employer]; at home  - Hobbies: travel; volunteer work ; soccer/baseball/basketball/  - Nutrition:     No breakfast   Lunch and dinner - protein, veggies starches  Water intake is good    - Physical Activity: walking, previously Volleyball - 1 year      HISTORY:  Past Medical History:    Hemochromatosis associated with compound heterozygous mutation in HFE gene    History of splenomegaly      Past Surgical History:   Procedure Laterality Date    Other      cyst removal    Other surgical history  2007    benign tumor in abdomen      Family History   Problem Relation Age of Onset    Diabetes Father     Other (Other) Father         MS    Diabetes Maternal Grandfather       Social History:   Social History     Socioeconomic History    Marital status:    Tobacco Use    Smoking status: Never    Smokeless tobacco: Never   Vaping Use    Vaping status: Never Used   Substance and Sexual Activity    Alcohol use: Yes     Alcohol/week: 0.0 standard drinks of alcohol     Comment: social    Drug use: No   Other Topics Concern    Reaction to local anesthetic No    Pt has a pacemaker No    Pt has a defibrillator No        Medications (Active prior to today's visit):  No current outpatient medications on file.       Allergies:  No Known Allergies      ROS:   Positive Findings indicated in BOLD    Constitutional: Fever, Chills, Weight Gain, Weight Loss, Night Sweats, Fatigue, Malaise  ENT/Mouth:   Hearing Changes, Ear Pain, Nasal Congestion, Sinus Pain, Hoarseness, Sore throat, Rhinorrhea, Swallowing Difficulty  Eyes: Eye Pain, Swelling, Redness, Foreign Body, Discharge, Vision Changes  Cardiovascular: Chest Pain, SOB, PND, Dyspnea on Exertion, Orthopnea, Claudication, Edema, Palpitations  Respiratory: Cough, Sputum, Wheezing, Shortness of breath  Gastrointestinal: Nausea, Vomiting, Diarrhea, Constipation, Pain, Heartburn, Dysphagia, Bloody stools, Tarry stools  Genitourinary: Dysmenorrhea, Dysuria, Urinary Frequency, Hematuria, Urinary Incontinence, Urgency,  Flank Pain  Musculoskeletal: Arthralgias, Myalgias, Joint Swelling, Joint Stiffness, Back Pain, Neck Pain  Integumentary: Skin Lesions, Pruritis, Hair Changes, Jaundice, Nail changes  Neuro: Weakness, Numbness, Paresthesias, Loss of Consciousness, Syncope, Dizziness, Headache, Falls  Psych: Anxiety, Depression, Insomnia, Suicidal Ideation, Homicidal ideation, Memory Changes  Heme/Lymph: Bruising, Bleeding, Lymphadenopathy  Endocrine: Polyuria, Polydipsia, Temperature Intolerance    PHYSICAL EXAM:   Vital Signs:  Blood pressure 112/80, pulse 56, temperature 97.8 °F (36.6 °C), temperature source Oral, height 6' 6\" (1.981 m), weight 271 lb (122.9 kg).     Constitutional: No acute distress. Alert and oriented x 3.  Eyes: EOMI, PERRLA, clear sclera b/l  HENT: NCAT, Moist mucous membranes, Oropharynx without erythema or exudates  Neck: No JVD, no thyromegaly  Cardiovascular: S1, S2, no S3, no S4, Regular rate and rhythm, No murmurs/gallops/rubs.   Vascular: Equal pulses 2+ carotids without bruits or thrills/radial/DP/PT bilaterally  Respiratory: Clear to auscultation bilaterally.  No wheezes/rales/rhonchi  Gastrointestinal: Soft, nontender, nondistended. Positive bowel sounds x 4. No rebound tenderness. No hepatomegaly, No splenomegaly  Genitourinary: No CVA tenderness bilaterally  Neurologic: No focal neurological deficits, CN II-XII intact, light touch  intact, MSK Strength 5/5 and symmetric in all extremities, normal gait  Musculoskeletal: Full range of motion of all extremities, no clubbing/swelling/edema  Skin: No lesions, No erythema, no jaundice, Cap Refill < 2s  Psychiatric: Appropriate mood and affect  Heme/Lymph/Immune: No cervical LAD      DATA REVIEWED   Labs:  No results found for this or any previous visit (from the past 8760 hours).      No results found for this or any previous visit (from the past 8760 hours).      Cholesterol, Total (mg/dL)   Date Value   10/13/2023 198     HDL Cholesterol (mg/dL)   Date Value   10/13/2023 43     LDL Cholesterol (mg/dL)   Date Value   10/13/2023 124 (H)     Triglycerides (mg/dL)   Date Value   10/13/2023 175 (H)       Last A1c value was  % done  .      Imaging:  Chest x-ray 8/24/2021  Impression   CONCLUSION: Normal examination.            CT cardiac screening 8/25/2021  Impression   CONCLUSION:   1. Cardiac CT over read performed.   2. No evidence of acute extracardiac intrathoracic abnormality.   3. Small retrocardiac hiatal hernia.   4. Lesser incidental findings as above.      CONCLUSION:      Normal coronary arteries.  Normal cardiac structures.    2D echo 8/25/2021  Study Conclusions   1. Left ventricle: The cavity size was normal. Wall thickness was      normal. Systolic function was normal. The estimated ejection      fraction was 65-70%, by biplane method of disks. Wall motion was      normal; there were no regional wall motion abnormalities. Left      ventricular diastolic function parameters were normal.   Normal study. No previous study was available for comparison. No   significant change since prior study.       MRI R Foot 7/13/2023  FINDINGS:  BONES/JOINTS: Diffuse edema in the bipartite medial hallux sesamoid.  Small 1st MTP joint effusion.  osseous and articular structures are otherwise intact.  Plantar flexion 2nd through 5th toes is probably positional.    SOFT TISSUES: No tendon or ligamentous  disruption within the limitations of the large field of view.  No suspicious mass or fluid collection. Surrounding soft tissues are normal.  OTHER: Negative.               Impression   CONCLUSION:  1. Sesamoiditis with diffuse edema in the bipartite medial hallux sesamoid.         ASSESSMENT/PLAN:       Hx of Pericarditis/myocarditis  Prior episodes from mid 2000 and again in 2011.  Prior work-ups including stress test and echo was negative.    Hospitalization reviewed as above.  Initial troponin was mildly elevated but normalized.  D-dimer negative.  Chest x-ray and echocardiogram unremarkable.   - Hospitalization reviewed as above. Troponin elevated but repeat ECHO, CTA unremarkable  -We discussed addressing noncardiac causes for his ongoing chest discomfort which is minimal and stable.  He will start a log for aggravating factors  - DC omeprazole as had no effect on symptoms  -Stress relief techniques such as massages  -Should do low intensity exercises for now until cleared by cardiology, walking is fine.  -Voltaren gel to address musculoskeletal cause.  Tylenol on an as-needed basis would also help his headaches.  - C/w colchicine 0.6 mg BID and indomethacin 50 mg TID as originally prescribed  - Cardiology follow-up with Dr. Godinez, low suspicion for cardiac etiology.  -Thankfully, symptoms have overall improved.  They are becoming more spreadout unless severe.  Still no precise etiology, but symptomatically improving.     Chronic Chest Pain  Extensive work-up for cardiopulmonary etiology as above.  We have also addressed other causes such as GERD, musculoskeletal etiologies with no significant improvement.  - Rheum work-up to ensure no other secondary cause of chest pain  - I question if he has some residual effects from prior pericarditis/myocarditis?  He may have some sensitivity that seems to be musculoskeletal, noncardiac in nature.  It is still manageable and not to the same extent as prior presentation.   We will continue to monitor for now     History of heterozygote for hemochromatosis  Noted 2/1/2008 heterozygous for hemachromatosis mutation. Full work-up with Dr. Cardoso of [facility] in [id number] with bone marrow biopsy negative.  -No clinical manifestations of hemochromatosis.  -Iron studies 2/5/2021 within normal limits     Hx of Splenomegaly  Underwent full work-up in 2007 and 2008 at [facility].  Heterozygote for hemachromatosis as above, otherwise unremarkable work-up    Sesamoiditis  Has been following up with podiatry, most recently seen by Dr. Mayfield 7/24/2023  -Ongoing conservative measures with cryotherapy/icing, resting, elevation         Orders This Visit:  Orders Placed This Encounter   Procedures    CBC With Differential With Platelet    Comp Metabolic Panel (14)    Ferritin    Lipid Panel    TSH W Reflex To Free T4    Iron And Tibc    C-Reactive Protein [E]       Meds This Visit:  Requested Prescriptions      No prescriptions requested or ordered in this encounter       Imaging & Referrals:  None     Health Maintenance  HTN Screen: BP at goal  DM Screen: Check fasting sugar  HLD Screen: Check fasting lipid panel  HCV Screen: Considered low risk  HIV Screen: considered low risk  G/C/Syphilis: Considered low risk    Colon Cancer Screening (45-70): Not indicated  Prostate Cancer Screening: (50-70): Not indicated  Lung Cancer Screening (55-79 with 30 p/year and active < 15 years): Not indicated  AAA Screening (65-75 Hx of smoking): Not indicated    Influenza: Annually   Td/Tdap: Last Tdap 2017, due 2027  Zoster (60+): Not indicated  HPV (19-26): Not indicated  Pneumococcal: Not indicated    Immunization History   Administered Date(s) Administered    Covid-19 Vaccine Moderna 100 mcg/0.5 ml 04/05/2021, 05/03/2021    Covid-19 Vaccine Moderna 50 Mcg/0.25 Ml 01/19/2022    Covid-19 Vaccine Pfizer 30 mcg/0.3 ml 03/09/2021    Covid-19 Vaccine Pfizer Bivalent 30mcg/0.3mL 11/03/2022    FLUAD High Dose 65 yr and older  (27831) 11/03/2022    FLULAVAL 6 months & older 0.5 ml Prefilled syringe (59124) 09/20/2021    FLUZONE 6 months and older PFS 0.5 ml (78684) 12/22/2018, 09/20/2021, 11/03/2022, 12/10/2023    FLUZONE 6-35 Mos 0.25 ml Dose Quad Split PF (01733) 11/27/2019    Flucelvax 0.5 Ml Quad PFS Single Dose 11/03/2020    Flucelvax Influenza vaccine, trivalent (ccIIV3), 0.5mL IM 11/03/2020    Fluvirin, 3 Years & >, Im 10/27/2015    HEP A 02/11/2011, 08/10/2011    HEP A,Ped/Adol,(2 Dose) 05/22/2003    HEP A/HEP B Combined 12/18/2015    Influenza 10/28/2013, 12/30/2016    Pfizer Covid-19 Vaccine 30mcg/0.3ml 12yrs+ 12/10/2023    TDAP 05/07/2010, 06/06/2017    Typhoid 05/22/2003    Typhoid, Oral 05/22/2003   Pended Date(s) Pended    FLUZONE 6 months and older PFS 0.5 ml (07676) 03/02/2018         Return to clinic in 12 mo for annual physical exam    Stephen Richardson MD, 05/01/25, 6:50 PM

## 2025-05-01 ENCOUNTER — OFFICE VISIT (OUTPATIENT)
Dept: INTERNAL MEDICINE CLINIC | Facility: CLINIC | Age: 40
End: 2025-05-01

## 2025-05-01 VITALS
SYSTOLIC BLOOD PRESSURE: 112 MMHG | DIASTOLIC BLOOD PRESSURE: 80 MMHG | WEIGHT: 271 LBS | BODY MASS INDEX: 31.35 KG/M2 | TEMPERATURE: 98 F | HEIGHT: 78 IN | HEART RATE: 56 BPM

## 2025-05-01 DIAGNOSIS — Z13.0 SCREENING FOR DEFICIENCY ANEMIA: ICD-10-CM

## 2025-05-01 DIAGNOSIS — Z13.1 SCREENING FOR DIABETES MELLITUS: ICD-10-CM

## 2025-05-01 DIAGNOSIS — Z13.220 SCREENING FOR LIPOID DISORDERS: ICD-10-CM

## 2025-05-01 DIAGNOSIS — Z14.8 HEMOCHROMATOSIS CARRIER: ICD-10-CM

## 2025-05-01 DIAGNOSIS — Z00.00 ANNUAL PHYSICAL EXAM: Primary | ICD-10-CM

## 2025-05-01 DIAGNOSIS — I31.9 PERICARDITIS, UNSPECIFIED CHRONICITY, UNSPECIFIED TYPE (HCC): ICD-10-CM

## 2025-05-01 DIAGNOSIS — Z13.29 SCREENING FOR THYROID DISORDER: ICD-10-CM

## 2025-05-01 DIAGNOSIS — R79.89 ELEVATED LFTS: ICD-10-CM

## 2025-08-11 ENCOUNTER — LAB ENCOUNTER (OUTPATIENT)
Dept: LAB | Age: 40
End: 2025-08-11
Attending: INTERNAL MEDICINE

## 2025-08-11 DIAGNOSIS — Z13.220 SCREENING FOR LIPOID DISORDERS: ICD-10-CM

## 2025-08-11 DIAGNOSIS — Z14.8 HEMOCHROMATOSIS CARRIER: ICD-10-CM

## 2025-08-11 DIAGNOSIS — Z00.00 ANNUAL PHYSICAL EXAM: ICD-10-CM

## 2025-08-11 DIAGNOSIS — R79.89 ELEVATED LFTS: ICD-10-CM

## 2025-08-11 DIAGNOSIS — I31.9 PERICARDITIS, UNSPECIFIED CHRONICITY, UNSPECIFIED TYPE (HCC): ICD-10-CM

## 2025-08-11 DIAGNOSIS — Z13.0 SCREENING FOR DEFICIENCY ANEMIA: ICD-10-CM

## 2025-08-11 DIAGNOSIS — Z13.29 SCREENING FOR THYROID DISORDER: ICD-10-CM

## 2025-08-11 LAB
ALBUMIN SERPL-MCNC: 4.7 G/DL (ref 3.2–4.8)
ALBUMIN/GLOB SERPL: 2 (ref 1–2)
ALP LIVER SERPL-CCNC: 78 U/L (ref 45–117)
ALT SERPL-CCNC: 49 U/L (ref 10–49)
ANION GAP SERPL CALC-SCNC: 9 MMOL/L (ref 0–18)
AST SERPL-CCNC: 27 U/L (ref ?–34)
BASOPHILS # BLD AUTO: 0.06 X10(3) UL (ref 0–0.2)
BASOPHILS NFR BLD AUTO: 1 %
BILIRUB SERPL-MCNC: 0.6 MG/DL (ref 0.3–1.2)
BUN BLD-MCNC: 12 MG/DL (ref 9–23)
BUN/CREAT SERPL: 13.6 (ref 10–20)
CALCIUM BLD-MCNC: 9.2 MG/DL (ref 8.7–10.4)
CHLORIDE SERPL-SCNC: 104 MMOL/L (ref 98–112)
CHOLEST SERPL-MCNC: 204 MG/DL (ref ?–200)
CO2 SERPL-SCNC: 27 MMOL/L (ref 21–32)
CREAT BLD-MCNC: 0.88 MG/DL (ref 0.7–1.3)
CRP SERPL-MCNC: <0.5 MG/DL (ref ?–0.5)
DEPRECATED HBV CORE AB SER IA-ACNC: 197 NG/ML (ref 50–336)
DEPRECATED RDW RBC AUTO: 38.5 FL (ref 35.1–46.3)
EGFRCR SERPLBLD CKD-EPI 2021: 111 ML/MIN/1.73M2 (ref 60–?)
EOSINOPHIL # BLD AUTO: 0.15 X10(3) UL (ref 0–0.7)
EOSINOPHIL NFR BLD AUTO: 2.4 %
ERYTHROCYTE [DISTWIDTH] IN BLOOD BY AUTOMATED COUNT: 12.1 % (ref 11–15)
FASTING PATIENT LIPID ANSWER: YES
FASTING STATUS PATIENT QL REPORTED: YES
GLOBULIN PLAS-MCNC: 2.4 G/DL (ref 2–3.5)
GLUCOSE BLD-MCNC: 98 MG/DL (ref 70–99)
HCT VFR BLD AUTO: 42.7 % (ref 39–53)
HDLC SERPL-MCNC: 44 MG/DL (ref 40–59)
HGB BLD-MCNC: 14.8 G/DL (ref 13–17.5)
IMM GRANULOCYTES # BLD AUTO: 0.02 X10(3) UL (ref 0–1)
IMM GRANULOCYTES NFR BLD: 0.3 %
IRON SATN MFR SERPL: 26 % (ref 20–50)
IRON SERPL-MCNC: 80 UG/DL (ref 65–175)
LDLC SERPL CALC-MCNC: 134 MG/DL (ref ?–100)
LYMPHOCYTES # BLD AUTO: 1.76 X10(3) UL (ref 1–4)
LYMPHOCYTES NFR BLD AUTO: 28.5 %
MCH RBC QN AUTO: 30 PG (ref 26–34)
MCHC RBC AUTO-ENTMCNC: 34.7 G/DL (ref 31–37)
MCV RBC AUTO: 86.4 FL (ref 80–100)
MONOCYTES # BLD AUTO: 0.54 X10(3) UL (ref 0.1–1)
MONOCYTES NFR BLD AUTO: 8.7 %
NEUTROPHILS # BLD AUTO: 3.65 X10 (3) UL (ref 1.5–7.7)
NEUTROPHILS # BLD AUTO: 3.65 X10(3) UL (ref 1.5–7.7)
NEUTROPHILS NFR BLD AUTO: 59.1 %
NONHDLC SERPL-MCNC: 160 MG/DL (ref ?–130)
OSMOLALITY SERPL CALC.SUM OF ELEC: 290 MOSM/KG (ref 275–295)
PLATELET # BLD AUTO: 267 10(3)UL (ref 150–450)
POTASSIUM SERPL-SCNC: 4 MMOL/L (ref 3.5–5.1)
PROT SERPL-MCNC: 7.1 G/DL (ref 5.7–8.2)
RBC # BLD AUTO: 4.94 X10(6)UL (ref 4.3–5.7)
SODIUM SERPL-SCNC: 140 MMOL/L (ref 136–145)
TOTAL IRON BINDING CAPACITY: 311 UG/DL (ref 250–425)
TRANSFERRIN SERPL-MCNC: 235 MG/DL (ref 215–365)
TRIGL SERPL-MCNC: 147 MG/DL (ref 30–149)
TSI SER-ACNC: 1.92 UIU/ML (ref 0.55–4.78)
VLDLC SERPL CALC-MCNC: 27 MG/DL (ref 0–30)
WBC # BLD AUTO: 6.2 X10(3) UL (ref 4–11)

## 2025-08-11 PROCEDURE — 84443 ASSAY THYROID STIM HORMONE: CPT

## 2025-08-11 PROCEDURE — 85025 COMPLETE CBC W/AUTO DIFF WBC: CPT

## 2025-08-11 PROCEDURE — 86140 C-REACTIVE PROTEIN: CPT

## 2025-08-11 PROCEDURE — 84466 ASSAY OF TRANSFERRIN: CPT

## 2025-08-11 PROCEDURE — 36415 COLL VENOUS BLD VENIPUNCTURE: CPT

## 2025-08-11 PROCEDURE — 82728 ASSAY OF FERRITIN: CPT

## 2025-08-11 PROCEDURE — 80061 LIPID PANEL: CPT

## 2025-08-11 PROCEDURE — 83540 ASSAY OF IRON: CPT

## 2025-08-11 PROCEDURE — 80053 COMPREHEN METABOLIC PANEL: CPT

## (undated) NOTE — MR AVS SNAPSHOT
[address] [facility] [facility] [id number] [phone]               Thank you for choosing us for your health care visit with Brady Benitez DO.   We are glad to serve you and happy to provide you with this liu authorization, such as [place], please feel free to schedule your appointment immediately. However, if you are unsure about the requirements for authorization, please wait 5-7 days and then contact your physician's   office.  At that time, you mojgan Call [phone] for help. [facility] is NOT to be used for urgent needs. For medical emergencies, dial 911. Educational Information     Your blood pressure indicates you may be at-risk for Hypertension.    Please consider the following Lifestyle M active are less likely to develop some chronic diseases than adults who are inactive.      HOW TO GET STARTED: HOW TO STAY MOTIVATED:   Start activities slowly and build up over time Do what you like   Get your heart pumping – brisk walking, biking, swimmin

## (undated) NOTE — LETTER
AUTHORIZATION FOR SURGICAL OPERATION OR OTHER PROCEDURE    1. I hereby [facility] Dr. Lexie Ann, [facility] assigned to my case to perform the following operation and/or procedure at the [facility], [facility]:    _______________________________________________________________________________________________    Right foot cortisone injection  _______________________________________________________________________________________________    2. My physician has explained the nature and purpose of the operation or other procedure, possible alternative methods of treatment, the risks involved, and the possibility of complication to me. I acknowledge that no guarantee has been made as to the result that may be obtained. 3.  I recognize that, during the course of this operation, or other procedure, unforseen conditions may necessitate additional or different procedure than those listed above. I, therefore, further authorize and request that the above named physician, his/her physician assistants or designees perform such procedures as are, in his/her professional opinion, necessary and desirable. 4.  Any tissue or organs removed in the operation or other procedure may be disposed of by and at the discretion of the [facility] and [facility] [place]. 5.  I understand that in the event of a medical emergency, I will be transported by local paramedics to [facility] or other hospital emergency department. 6.  I certify that I have read and fully understand the above consent to operation and/or other procedure. 7.  I acknowledge that my physician has explained sedation/analgesia administration to me including the risks and benefits. I consent to the administration of sedation/analgesia as may be necessary or desirable in the judgement of my physician.     Witness signature: ___________________________________________________ Date:  ______/______/_____ Time:  ________ A. M.  P.M. Patient Name:  ______________________________________________________  (please print)      Patient signature:  ___________________________________________________             Relationship to Patient:           []  Parent    Responsible person                          []  Spouse  In case of minor or                    [] Other  _____________   Incompetent name:  __________________________________________________                               (please print)      _____________      Responsible person  In case of minor or  Incompetent signature:  _______________________________________________    Statement of Physician  My signature below affirms that prior to the time of the procedure, I have explained to the patient and/or his/her guardian, the risks and benefits involved in the proposed treatment and any reasonable alternative to the proposed treatment. I have also explained the risks and benefits involved in the refusal of the proposed treatment and have answered the patient's questions.                         Date:  ______/______/_______  Provider                      Signature:  __________________________________________________________       Time:  ___________ A.M    P.M.

## (undated) NOTE — LETTER
[address] [facility]    Consent for Coronary CT Angiography    Your doctor has recommended that you have a Coronary CT Angiography procedure.  Coronary CT Angiography is a diagnostic procedure using computed tomography to scan the can range from very minor to very serious leading to a life threatening situation or even death. Please be sure to communicate any allergy you may have to your caregiver immediately.     The information that is obtained during your testing will be treated a